# Patient Record
Sex: FEMALE | Race: WHITE | NOT HISPANIC OR LATINO | Employment: UNEMPLOYED | URBAN - METROPOLITAN AREA
[De-identification: names, ages, dates, MRNs, and addresses within clinical notes are randomized per-mention and may not be internally consistent; named-entity substitution may affect disease eponyms.]

---

## 2023-08-16 PROBLEM — H26.9 CATARACT OF RIGHT EYE: Status: ACTIVE | Noted: 2023-08-16

## 2023-08-16 PROBLEM — H26.492 PCO (POSTERIOR CAPSULAR OPACIFICATION), LEFT: Status: ACTIVE | Noted: 2023-08-16

## 2023-08-16 PROBLEM — L72.0 EPIDERMAL INCLUSION CYST: Status: ACTIVE | Noted: 2023-08-16

## 2023-08-16 PROBLEM — H53.8 BLURRED VISION, BILATERAL: Status: ACTIVE | Noted: 2023-08-16

## 2023-08-16 PROBLEM — Z96.1 PSEUDOPHAKIA: Status: ACTIVE | Noted: 2023-08-16

## 2023-08-16 PROBLEM — H52.13 MYOPIA OF BOTH EYES: Status: ACTIVE | Noted: 2023-08-16

## 2023-08-16 PROBLEM — H52.7 REFRACTIVE ERROR: Status: ACTIVE | Noted: 2023-08-16

## 2023-08-16 PROBLEM — I10 HTN (HYPERTENSION): Status: ACTIVE | Noted: 2023-08-16

## 2023-08-16 PROBLEM — H25.13 NUCLEAR SCLEROSIS OF BOTH EYES: Status: ACTIVE | Noted: 2023-08-16

## 2023-08-16 PROBLEM — N95.1 MENOPAUSAL SYMPTOM: Status: ACTIVE | Noted: 2023-08-16

## 2023-08-16 PROBLEM — H35.352 CYSTOID MACULAR EDEMA, LEFT EYE: Status: ACTIVE | Noted: 2023-08-16

## 2023-08-16 RX ORDER — NAPROXEN SODIUM 220 MG/1
1 TABLET, FILM COATED ORAL EVERY OTHER DAY
COMMUNITY
End: 2023-12-01 | Stop reason: WASHOUT

## 2023-08-16 RX ORDER — CHOLECALCIFEROL (VITAMIN D3) 25 MCG
TABLET ORAL
COMMUNITY

## 2023-08-16 RX ORDER — MULTIVIT-MIN/FA/LYCOPEN/LUTEIN .4-300-25
TABLET ORAL
COMMUNITY

## 2023-09-02 PROBLEM — H52.13 MYOPIA OF BOTH EYES WITH REGULAR ASTIGMATISM: Status: ACTIVE | Noted: 2023-09-02

## 2023-09-02 PROBLEM — H52.223 MYOPIA OF BOTH EYES WITH REGULAR ASTIGMATISM: Status: ACTIVE | Noted: 2023-09-02

## 2023-10-10 ENCOUNTER — OFFICE VISIT (OUTPATIENT)
Dept: OPHTHALMOLOGY | Facility: CLINIC | Age: 73
End: 2023-10-10
Payer: COMMERCIAL

## 2023-10-10 DIAGNOSIS — H25.11 AGE-RELATED NUCLEAR CATARACT OF RIGHT EYE: ICD-10-CM

## 2023-10-10 DIAGNOSIS — H52.223 REGULAR ASTIGMATISM OF BOTH EYES: ICD-10-CM

## 2023-10-10 DIAGNOSIS — H43.811 POSTERIOR VITREOUS DETACHMENT OF RIGHT EYE: ICD-10-CM

## 2023-10-10 DIAGNOSIS — H52.4 PRESBYOPIA: ICD-10-CM

## 2023-10-10 DIAGNOSIS — H52.13 MYOPIA OF BOTH EYES: Primary | ICD-10-CM

## 2023-10-10 PROCEDURE — 92014 COMPRE OPH EXAM EST PT 1/>: CPT | Performed by: OPTOMETRIST

## 2023-10-10 PROCEDURE — 92015 DETERMINE REFRACTIVE STATE: CPT | Performed by: OPTOMETRIST

## 2023-10-10 ASSESSMENT — VISUAL ACUITY
OD_CC: 20/40-1
OS_CC: 20/40-1
OS_CC+: -2
OD_CC: 20/20
METHOD: SNELLEN - LINEAR
CORRECTION_TYPE: CONTACTS
OS_CC: 20/25
OD_CC+: -1
METHOD_MR_RETINOSCOPY: 1

## 2023-10-10 ASSESSMENT — ENCOUNTER SYMPTOMS
CONSTITUTIONAL NEGATIVE: 0
PSYCHIATRIC NEGATIVE: 0
RESPIRATORY NEGATIVE: 0
MUSCULOSKELETAL NEGATIVE: 0
CARDIOVASCULAR NEGATIVE: 0
ENDOCRINE NEGATIVE: 0
EYES NEGATIVE: 0
HEMATOLOGIC/LYMPHATIC NEGATIVE: 0
ALLERGIC/IMMUNOLOGIC NEGATIVE: 0
GASTROINTESTINAL NEGATIVE: 0
NEUROLOGICAL NEGATIVE: 0

## 2023-10-10 ASSESSMENT — REFRACTION_MANIFEST
OS_AXIS: 005
OS_SPHERE: -1.50
OD_CYLINDER: -1.00
OS_AXIS: 025
OD_SPHERE: -2.50
OD_SPHERE: -1.75
OD_ADD: +2.50
OS_ADD: +2.50
OD_AXIS: 060
OS_CYLINDER: -1.50
OD_AXIS: 155
OS_SPHERE: -2.50
OS_CYLINDER: +0.50
OD_CYLINDER: +1.25

## 2023-10-10 ASSESSMENT — CUP TO DISC RATIO
OD_RATIO: 0.3
OS_RATIO: 0.3

## 2023-10-10 ASSESSMENT — CONF VISUAL FIELD
OS_SUPERIOR_TEMPORAL_RESTRICTION: 0
OD_INFERIOR_NASAL_RESTRICTION: 0
OD_INFERIOR_TEMPORAL_RESTRICTION: 0
METHOD: COUNTING FINGERS
OD_NORMAL: 1
OS_INFERIOR_NASAL_RESTRICTION: 0
OD_SUPERIOR_NASAL_RESTRICTION: 0
OD_SUPERIOR_TEMPORAL_RESTRICTION: 0
OS_INFERIOR_TEMPORAL_RESTRICTION: 0
OS_SUPERIOR_NASAL_RESTRICTION: 0
OS_NORMAL: 1

## 2023-10-10 ASSESSMENT — TONOMETRY
OS_IOP_MMHG: 14
IOP_METHOD: GOLDMANN APPLANATION
OD_IOP_MMHG: 12

## 2023-10-10 ASSESSMENT — REFRACTION_CURRENTRX
OD_DIAMETER: 9.5
OD_ADDL_SPECS: GREEN
OD_SPHERE: -1.50
OD_BASECURVE: 7.6
OS_BRAND: ESSILOR RGP
OS_DIAMETER: 9.5
OD_BRAND: ESSILOR RGP
OS_BASECURVE: 7.44
OS_SPHERE: -2.25
OS_ADDL_SPECS: GREEN

## 2023-10-10 ASSESSMENT — EXTERNAL EXAM - LEFT EYE: OS_EXAM: NORMAL

## 2023-10-10 ASSESSMENT — EXTERNAL EXAM - RIGHT EYE: OD_EXAM: NORMAL

## 2023-10-10 NOTE — PROGRESS NOTES
Assessment/Plan   Problem List Items Addressed This Visit             ICD-10-CM       Eye/Vision problems    Myopia of both eyes - Primary H52.13     New spec rx released today per patient request. Ocular health wnl for age OU. Monitor 1 year or sooner prn. Refraction billed today.          Cataract of right eye H26.9     Patient's cataracts are not visually significant. Will monitor for changes. No indication for surgery at this time.          Regular astigmatism of both eyes H52.223    Presbyopia H52.4    Posterior vitreous detachment of right eye H43.811     The patient was advised that he/she has a vitreous detachment.  As we age the gel inside the eye shrinks, and moves forward away from the retina. The fibrous ring that used to anchor the gel to the back of the eye is now floating freely inside the eye and is seen as a large floater. Other optical imperfections may also be visible.  This floater will never go away but will become less noticeable with time. The floater will be apparent most often while reading and in bright light. Also as this gel shrinks it can tug on the retina causing the patient to see flashes of light. The concern is that as it tugs it may tear the retina leading to a retinal detachment. No retinal tears have been seen today. The patient is to follow-up in 6-8wks for another dilated exam.  The patient is at increased risk for a retinal tear or detachment.  The patient has been told that if they notice an increase in number or frequency of floaters or flashes or if the patient notices a curtain falling over any part of their vision this could indicate a problem with the retina that could require immediate treatment.  The patient is to call the office ASAP if the listed symptoms occur.         Mac OCT at next.     Recommend WC qday x 8-10min and artificial tears tid both eyes (OU).   Will also recheck CL wetting at next. Consider hydrapeg/plasma

## 2023-10-10 NOTE — ASSESSMENT & PLAN NOTE
Patient's cataracts are not visually significant. Will monitor for changes. No indication for surgery at this time.

## 2023-10-10 NOTE — ASSESSMENT & PLAN NOTE
New spec rx released today per patient request. Ocular health wnl for age OU. Monitor 1 year or sooner prn. Refraction billed today.

## 2023-10-10 NOTE — ASSESSMENT & PLAN NOTE
The patient was advised that he/she has a vitreous detachment.  As we age the gel inside the eye shrinks, and moves forward away from the retina. The fibrous ring that used to anchor the gel to the back of the eye is now floating freely inside the eye and is seen as a large floater. Other optical imperfections may also be visible.  This floater will never go away but will become less noticeable with time. The floater will be apparent most often while reading and in bright light. Also as this gel shrinks it can tug on the retina causing the patient to see flashes of light. The concern is that as it tugs it may tear the retina leading to a retinal detachment. No retinal tears have been seen today. The patient is to follow-up in 6-8wks for another dilated exam.  The patient is at increased risk for a retinal tear or detachment.  The patient has been told that if they notice an increase in number or frequency of floaters or flashes or if the patient notices a curtain falling over any part of their vision this could indicate a problem with the retina that could require immediate treatment.  The patient is to call the office ASAP if the listed symptoms occur.

## 2023-12-01 ENCOUNTER — APPOINTMENT (OUTPATIENT)
Dept: OPHTHALMOLOGY | Facility: CLINIC | Age: 73
End: 2023-12-01
Payer: MEDICARE

## 2023-12-01 ENCOUNTER — OFFICE VISIT (OUTPATIENT)
Dept: OPHTHALMOLOGY | Facility: CLINIC | Age: 73
End: 2023-12-01
Payer: MEDICARE

## 2023-12-01 DIAGNOSIS — H35.371 EPIRETINAL MEMBRANE (ERM) OF RIGHT EYE: ICD-10-CM

## 2023-12-01 DIAGNOSIS — H43.811 POSTERIOR VITREOUS DETACHMENT OF RIGHT EYE: Primary | ICD-10-CM

## 2023-12-01 PROCEDURE — 99214 OFFICE O/P EST MOD 30 MIN: CPT | Performed by: OPTOMETRIST

## 2023-12-01 PROCEDURE — 92134 CPTRZ OPH DX IMG PST SGM RTA: CPT | Mod: BILATERAL PROCEDURE | Performed by: OPTOMETRIST

## 2023-12-01 ASSESSMENT — CUP TO DISC RATIO
OD_RATIO: 0.3
OS_RATIO: 0.3

## 2023-12-01 ASSESSMENT — TONOMETRY
IOP_METHOD: GOLDMANN APPLANATION
OD_IOP_MMHG: 13
OS_IOP_MMHG: 15

## 2023-12-01 ASSESSMENT — VISUAL ACUITY
OS_CC+: -3
METHOD: SNELLEN - LINEAR
CORRECTION_TYPE: CONTACTS
OD_CC: 20/25
OS_CC: 20/25

## 2023-12-01 ASSESSMENT — CONF VISUAL FIELD
OS_INFERIOR_TEMPORAL_RESTRICTION: 0
OS_SUPERIOR_TEMPORAL_RESTRICTION: 0
OS_NORMAL: 1
OS_INFERIOR_NASAL_RESTRICTION: 0
METHOD: COUNTING FINGERS
OS_SUPERIOR_NASAL_RESTRICTION: 0

## 2023-12-01 ASSESSMENT — EXTERNAL EXAM - RIGHT EYE: OD_EXAM: NORMAL

## 2023-12-01 ASSESSMENT — EXTERNAL EXAM - LEFT EYE: OS_EXAM: NORMAL

## 2023-12-01 NOTE — PROGRESS NOTES
Assessment/Plan   Diagnoses and all orders for this visit:  Posterior vitreous detachment of right eye  -     OCT, Retina - OU - Both Eyes  Epiretinal membrane (ERM) of right eye  Discussed findings and etiology. ERM not visually significant at this time. Amsler grid for at home monitoring. RTC 1 year for OCT Macula or sooner with changes on grid.Retina precautions given.    Monitor 1 year.   Continue w/ dry eye treatment WC at ATs bid-tid both eyes.

## 2024-07-29 ENCOUNTER — APPOINTMENT (OUTPATIENT)
Dept: OPHTHALMOLOGY | Facility: CLINIC | Age: 74
End: 2024-07-29
Payer: MEDICARE

## 2024-07-29 DIAGNOSIS — H35.371 EPIRETINAL MEMBRANE (ERM) OF RIGHT EYE: ICD-10-CM

## 2024-07-29 DIAGNOSIS — H35.342 MACULAR HOLE, LEFT: ICD-10-CM

## 2024-07-29 DIAGNOSIS — H35.341 MACULAR HOLE, RIGHT EYE: Primary | ICD-10-CM

## 2024-07-29 PROCEDURE — 99213 OFFICE O/P EST LOW 20 MIN: CPT | Performed by: OPTOMETRIST

## 2024-07-29 PROCEDURE — 92134 CPTRZ OPH DX IMG PST SGM RTA: CPT | Performed by: OPTOMETRIST

## 2024-07-29 ASSESSMENT — REFRACTION_CURRENTRX
OS_ADDL_SPECS: GREEN
OD_BRAND: ESSILOR RGP
OS_SPHERE: -2.25
OD_DIAMETER: 9.5
OS_CYLINDER: -1.50
OS_DIAMETER: 9.5
OD_BASECURVE: 7.6
OD_AXIS: 060
OS_AXIS: 030
OS_SPHERE: -1.50
OD_SPHERE: -1.50
OS_BRAND: ESSILOR RGP
OD_ADDL_SPECS: GREEN
OS_BASECURVE: 7.44
OD_SPHERE: -1.75
OD_CYLINDER: -1.00

## 2024-07-29 ASSESSMENT — VISUAL ACUITY
OS_CC: 20/25
VA_OR_OD_CURRENT_RX: 20/40
OS_CC+: -3
METHOD: SNELLEN - LINEAR
VA_OR_OD_CURRENT_RX: 20/40
VA_OR_OS_CURRENT_RX: 20/25
OD_CC: 20/40
OD_CC+: +1
CORRECTION_TYPE: CONTACTS

## 2024-07-29 ASSESSMENT — CONF VISUAL FIELD
OD_INFERIOR_NASAL_RESTRICTION: 0
METHOD: COUNTING FINGERS
OS_SUPERIOR_NASAL_RESTRICTION: 0
OD_INFERIOR_TEMPORAL_RESTRICTION: 0
OS_INFERIOR_TEMPORAL_RESTRICTION: 0
OD_NORMAL: 1
OD_SUPERIOR_TEMPORAL_RESTRICTION: 0
OD_SUPERIOR_NASAL_RESTRICTION: 0
OS_INFERIOR_NASAL_RESTRICTION: 0
OS_NORMAL: 1
OS_SUPERIOR_TEMPORAL_RESTRICTION: 0

## 2024-07-29 ASSESSMENT — EXTERNAL EXAM - RIGHT EYE: OD_EXAM: NORMAL

## 2024-07-29 ASSESSMENT — ENCOUNTER SYMPTOMS
EYES NEGATIVE: 0
ALLERGIC/IMMUNOLOGIC NEGATIVE: 0
HEMATOLOGIC/LYMPHATIC NEGATIVE: 0
CONSTITUTIONAL NEGATIVE: 0
ENDOCRINE NEGATIVE: 0
GASTROINTESTINAL NEGATIVE: 0
RESPIRATORY NEGATIVE: 0
CARDIOVASCULAR NEGATIVE: 0
PSYCHIATRIC NEGATIVE: 0
MUSCULOSKELETAL NEGATIVE: 0
NEUROLOGICAL NEGATIVE: 0

## 2024-07-29 ASSESSMENT — REFRACTION_WEARINGRX
OD_ADD: +2.50
OS_ADD: +2.50
OD_AXIS: 060
OD_SPHERE: -1.75
OS_CYLINDER: -1.50
OS_SPHERE: -1.50
OD_CYLINDER: -1.00
OS_AXIS: 025

## 2024-07-29 ASSESSMENT — EXTERNAL EXAM - LEFT EYE: OS_EXAM: NORMAL

## 2024-07-29 ASSESSMENT — CUP TO DISC RATIO
OD_RATIO: 0.3
OS_RATIO: 0.3

## 2024-07-29 NOTE — PROGRESS NOTES
Assessment/Plan   Diagnoses and all orders for this visit:  Macular hole, right eye  -     OCT, Retina - OU - Both Eyes  Consult retina this week. New small FTMH, visually significant. Previous FTMH repair w/ JJE in 2020.   Ok to wear contacts and glasses.   Epiretinal membrane (ERM) of right eye  Macular hole, left

## 2024-08-01 ENCOUNTER — OFFICE VISIT (OUTPATIENT)
Dept: OPHTHALMOLOGY | Facility: CLINIC | Age: 74
End: 2024-08-01
Payer: MEDICARE

## 2024-08-01 DIAGNOSIS — H35.341 MACULAR HOLE, RIGHT EYE: Primary | ICD-10-CM

## 2024-08-01 PROCEDURE — 92134 CPTRZ OPH DX IMG PST SGM RTA: CPT

## 2024-08-01 PROCEDURE — 99214 OFFICE O/P EST MOD 30 MIN: CPT

## 2024-08-01 RX ORDER — KETOROLAC TROMETHAMINE 4 MG/ML
1 SOLUTION/ DROPS OPHTHALMIC 4 TIMES DAILY
Qty: 5 ML | Refills: 0 | Status: SHIPPED | OUTPATIENT
Start: 2024-08-01 | End: 2024-09-15

## 2024-08-01 RX ORDER — DORZOLAMIDE HCL 20 MG/ML
SOLUTION/ DROPS OPHTHALMIC
Qty: 5 ML | Refills: 0 | Status: SHIPPED | OUTPATIENT
Start: 2024-08-01

## 2024-08-01 RX ORDER — DIFLUPREDNATE OPHTHALMIC 0.5 MG/ML
1 EMULSION OPHTHALMIC EVERY 8 HOURS
Qty: 5 ML | Refills: 0 | Status: SHIPPED | OUTPATIENT
Start: 2024-08-01 | End: 2024-09-15

## 2024-08-01 ASSESSMENT — CUP TO DISC RATIO
OD_RATIO: 0.3
OS_RATIO: 0.3

## 2024-08-01 ASSESSMENT — CONF VISUAL FIELD
OS_INFERIOR_NASAL_RESTRICTION: 0
OS_INFERIOR_TEMPORAL_RESTRICTION: 0
OD_SUPERIOR_TEMPORAL_RESTRICTION: 0
OD_SUPERIOR_NASAL_RESTRICTION: 0
OD_NORMAL: 1
OS_SUPERIOR_TEMPORAL_RESTRICTION: 0
OD_INFERIOR_NASAL_RESTRICTION: 0
OS_NORMAL: 1
OD_INFERIOR_TEMPORAL_RESTRICTION: 0
OS_SUPERIOR_NASAL_RESTRICTION: 0

## 2024-08-01 ASSESSMENT — VISUAL ACUITY
OD_CC: 20/30
OS_CC+: -2
METHOD: SNELLEN - LINEAR
OD_CC+: -2
OS_CC: 20/40

## 2024-08-01 ASSESSMENT — ENCOUNTER SYMPTOMS
CONSTITUTIONAL NEGATIVE: 0
EYES NEGATIVE: 0
ENDOCRINE NEGATIVE: 0
PSYCHIATRIC NEGATIVE: 0
NEUROLOGICAL NEGATIVE: 0
MUSCULOSKELETAL NEGATIVE: 0
ALLERGIC/IMMUNOLOGIC NEGATIVE: 0
RESPIRATORY NEGATIVE: 0
HEMATOLOGIC/LYMPHATIC NEGATIVE: 0
CARDIOVASCULAR NEGATIVE: 0
GASTROINTESTINAL NEGATIVE: 0

## 2024-08-01 ASSESSMENT — TONOMETRY
IOP_METHOD: TONOPEN
OD_IOP_MMHG: 15
OS_IOP_MMHG: 15

## 2024-08-01 ASSESSMENT — EXTERNAL EXAM - RIGHT EYE: OD_EXAM: NORMAL

## 2024-08-01 ASSESSMENT — EXTERNAL EXAM - LEFT EYE: OS_EXAM: NORMAL

## 2024-08-01 NOTE — PROGRESS NOTES
# Macular hole, right eye  # Epiretinal membrane (ERM) of right eye  - Referral from Dr. De Jesus for new small FTMH, visually significant.   - Sx of blurry vision OD for past 2 wks     # Macular hole left eye  - s/p PPV ICG ILM Peel FAX 20% SF6 OS ([facility] 11/09/20)  - s/p Phaco IOL OS  ([facility])  - s/p Yag OS  - Retina attached in all 4 quadrants, hole closed with persistent residual CME    OCT : 08/01/24     OD: small full thickness macular hole with adjacent intraretinal cystic spaces, epiretinal membrane (ERM)  OS: loss of foveal contour, cystic intraretinal space parafoveal, normal EZ; stable over the past few year      Plan:   - FTMH is very small; will try medical management of first with dorzolamide BID OD, Durezol TID OD, and Ketorolac QID OD  - If issues with insurance not covering Durezol, will prescribe pred QID OD instead   - If macular hole OD does not close, will consider surgery   - RTC in 1 month       # Refractive error  - Wears contacts and glasses   - Follows with Dr. De Jesus

## 2024-09-05 ENCOUNTER — APPOINTMENT (OUTPATIENT)
Dept: OPHTHALMOLOGY | Facility: CLINIC | Age: 74
End: 2024-09-05
Payer: MEDICARE

## 2024-09-05 ENCOUNTER — OFFICE VISIT (OUTPATIENT)
Dept: OPHTHALMOLOGY | Facility: CLINIC | Age: 74
End: 2024-09-05
Payer: MEDICARE

## 2024-09-05 DIAGNOSIS — H35.341 MACULAR HOLE, RIGHT EYE: ICD-10-CM

## 2024-09-05 DIAGNOSIS — H35.371 EPIRETINAL MEMBRANE (ERM) OF RIGHT EYE: ICD-10-CM

## 2024-09-05 DIAGNOSIS — H35.351 CYSTOID MACULAR EDEMA, RIGHT EYE: Primary | ICD-10-CM

## 2024-09-05 PROCEDURE — 92134 CPTRZ OPH DX IMG PST SGM RTA: CPT

## 2024-09-05 PROCEDURE — 99213 OFFICE O/P EST LOW 20 MIN: CPT

## 2024-09-05 RX ORDER — PREDNISOLONE ACETATE 10 MG/ML
SUSPENSION/ DROPS OPHTHALMIC
Qty: 5 ML | Refills: 1 | Status: SHIPPED | OUTPATIENT
Start: 2024-09-05

## 2024-09-05 RX ORDER — KETOROLAC TROMETHAMINE 5 MG/ML
SOLUTION OPHTHALMIC
Qty: 5 ML | Refills: 2 | Status: SHIPPED | OUTPATIENT
Start: 2024-09-05

## 2024-09-05 ASSESSMENT — CONF VISUAL FIELD
OD_SUPERIOR_NASAL_RESTRICTION: 0
OS_NORMAL: 1
OD_INFERIOR_TEMPORAL_RESTRICTION: 0
OD_SUPERIOR_TEMPORAL_RESTRICTION: 0
OS_SUPERIOR_TEMPORAL_RESTRICTION: 0
OS_INFERIOR_TEMPORAL_RESTRICTION: 0
OD_INFERIOR_NASAL_RESTRICTION: 0
OD_NORMAL: 1
OS_SUPERIOR_NASAL_RESTRICTION: 0
OS_INFERIOR_NASAL_RESTRICTION: 0

## 2024-09-05 ASSESSMENT — ENCOUNTER SYMPTOMS
CONSTITUTIONAL NEGATIVE: 0
HEMATOLOGIC/LYMPHATIC NEGATIVE: 0
ENDOCRINE NEGATIVE: 0
RESPIRATORY NEGATIVE: 0
ALLERGIC/IMMUNOLOGIC NEGATIVE: 0
CARDIOVASCULAR NEGATIVE: 0
NEUROLOGICAL NEGATIVE: 0
GASTROINTESTINAL NEGATIVE: 0
MUSCULOSKELETAL NEGATIVE: 0
EYES NEGATIVE: 1
PSYCHIATRIC NEGATIVE: 0

## 2024-09-05 ASSESSMENT — EXTERNAL EXAM - LEFT EYE: OS_EXAM: NORMAL

## 2024-09-05 ASSESSMENT — VISUAL ACUITY
CORRECTION_TYPE: GLASSES
OS_CC+: -1
METHOD: SNELLEN - LINEAR
OS_CC: 20/30
OD_CC: 20/30

## 2024-09-05 ASSESSMENT — TONOMETRY
IOP_METHOD: TONOPEN
OD_IOP_MMHG: 16
OS_IOP_MMHG: 12

## 2024-09-05 ASSESSMENT — CUP TO DISC RATIO
OD_RATIO: 0.3
OS_RATIO: 0.3

## 2024-09-05 ASSESSMENT — EXTERNAL EXAM - RIGHT EYE: OD_EXAM: NORMAL

## 2024-09-05 NOTE — PROGRESS NOTES
# Macular hole, right eye  # Epiretinal membrane (ERM) of right eye  - Referral from Dr. De Jesus for new small FTMH, visually significant.   - Sx of blurry vision OD for past 2 wks     # Macular hole left eye  - s/p PPV ICG ILM Peel FAX 20% SF6 OS ([facility] 11/09/20)  - s/p Phaco IOL OS  ([facility])  - s/p Yag OS  - Retina attached in all 4 quadrants, hole closed with persistent residual CME    OCT : 09/05/24     OD: small full thickness macular hole with adjacent intraretinal cystic spaces, epiretinal membrane (ERM)  OS: loss of foveal contour, cystic intraretinal space parafoveal, normal EZ; stable over the past few year    Plan:   - FTMH is very small; will try medical management of first with dorzolamide BID OD, Durezol TID OD, and Ketorolac QID OD; still persistent ; will try another month considering subjective improvement. Schedule surgery in case no closure by next visit  - If issues with insurance not covering Durezol, will prescribe pred QID OD instead   - If macular hole OD does not close, will consider surgery   - RTC in 4 weeks     # Refractive error  - Wears contacts and glasses   - Follows with Dr. De Jesus

## 2024-09-27 ENCOUNTER — TELEPHONE (OUTPATIENT)
Dept: OPHTHALMOLOGY | Facility: HOSPITAL | Age: 74
End: 2024-09-27
Payer: MEDICARE

## 2024-09-27 DIAGNOSIS — H35.351 CYSTOID MACULAR EDEMA, RIGHT EYE: Primary | ICD-10-CM

## 2024-09-27 DIAGNOSIS — H35.351 CYSTOID MACULAR EDEMA OF RIGHT EYE: Primary | ICD-10-CM

## 2024-09-27 RX ORDER — PREDNISOLONE ACETATE 10 MG/ML
1 SUSPENSION/ DROPS OPHTHALMIC 4 TIMES DAILY
Qty: 5 ML | Refills: 0 | Status: SHIPPED | OUTPATIENT
Start: 2024-09-27 | End: 2024-10-11

## 2024-09-27 RX ORDER — PREDNISOLONE ACETATE 10 MG/ML
SUSPENSION/ DROPS OPHTHALMIC
Qty: 5 ML | Refills: 1 | Status: SHIPPED | OUTPATIENT
Start: 2024-09-27 | End: 2024-09-27

## 2024-10-02 ENCOUNTER — PREP FOR PROCEDURE (OUTPATIENT)
Dept: OPHTHALMOLOGY | Facility: CLINIC | Age: 74
End: 2024-10-02

## 2024-10-02 ENCOUNTER — APPOINTMENT (OUTPATIENT)
Dept: OPHTHALMOLOGY | Facility: CLINIC | Age: 74
End: 2024-10-02
Payer: MEDICARE

## 2024-10-02 DIAGNOSIS — H35.341 MACULAR HOLE, RIGHT EYE: Primary | ICD-10-CM

## 2024-10-02 DIAGNOSIS — H35.371 EPIRETINAL MEMBRANE (ERM) OF RIGHT EYE: ICD-10-CM

## 2024-10-02 DIAGNOSIS — H35.342 MACULAR HOLE, LEFT: Primary | ICD-10-CM

## 2024-10-02 RX ORDER — SODIUM CHLORIDE, SODIUM LACTATE, POTASSIUM CHLORIDE, CALCIUM CHLORIDE 600; 310; 30; 20 MG/100ML; MG/100ML; MG/100ML; MG/100ML
20 INJECTION, SOLUTION INTRAVENOUS CONTINUOUS
OUTPATIENT
Start: 2024-10-02

## 2024-10-02 RX ORDER — PHENYLEPHRINE HYDROCHLORIDE 25 MG/ML
1 SOLUTION/ DROPS OPHTHALMIC
OUTPATIENT
Start: 2024-10-02 | End: 2024-10-02

## 2024-10-02 RX ORDER — TROPICAMIDE 10 MG/ML
1 SOLUTION/ DROPS OPHTHALMIC
OUTPATIENT
Start: 2024-10-02 | End: 2024-10-02

## 2024-10-02 RX ORDER — PROPARACAINE HYDROCHLORIDE 5 MG/ML
1 SOLUTION/ DROPS OPHTHALMIC ONCE
OUTPATIENT
Start: 2024-10-02 | End: 2024-10-02

## 2024-10-02 ASSESSMENT — TONOMETRY
IOP_METHOD: GOLDMANN APPLANATION
OD_IOP_MMHG: 16
OS_IOP_MMHG: 12

## 2024-10-02 ASSESSMENT — ENCOUNTER SYMPTOMS
CONSTITUTIONAL NEGATIVE: 0
MUSCULOSKELETAL NEGATIVE: 0
NEUROLOGICAL NEGATIVE: 0
EYES NEGATIVE: 0
ENDOCRINE NEGATIVE: 0
RESPIRATORY NEGATIVE: 0
CARDIOVASCULAR NEGATIVE: 0
PSYCHIATRIC NEGATIVE: 0
ALLERGIC/IMMUNOLOGIC NEGATIVE: 0
HEMATOLOGIC/LYMPHATIC NEGATIVE: 0
GASTROINTESTINAL NEGATIVE: 0

## 2024-10-02 ASSESSMENT — CONF VISUAL FIELD
OD_SUPERIOR_NASAL_RESTRICTION: 0
OS_INFERIOR_TEMPORAL_RESTRICTION: 0
OS_INFERIOR_NASAL_RESTRICTION: 0
OS_NORMAL: 1
OD_INFERIOR_NASAL_RESTRICTION: 0
OS_SUPERIOR_TEMPORAL_RESTRICTION: 0
OD_INFERIOR_TEMPORAL_RESTRICTION: 0
OD_NORMAL: 1
OD_SUPERIOR_TEMPORAL_RESTRICTION: 0
METHOD: COUNTING FINGERS
OS_SUPERIOR_NASAL_RESTRICTION: 0

## 2024-10-02 ASSESSMENT — CUP TO DISC RATIO
OS_RATIO: 0.3
OD_RATIO: 0.3

## 2024-10-02 ASSESSMENT — VISUAL ACUITY
OD_CC: 20/30
METHOD: SNELLEN - LINEAR
OS_CC: 20/30

## 2024-10-02 ASSESSMENT — EXTERNAL EXAM - LEFT EYE: OS_EXAM: NORMAL

## 2024-10-02 ASSESSMENT — EXTERNAL EXAM - RIGHT EYE: OD_EXAM: NORMAL

## 2024-10-02 NOTE — H&P
History Of Present Illness  Ronna Elizalde is a 74 y.o. female presenting with [facility] OD.     Past Medical History  She has a past medical history of Age-related nuclear cataract, left eye (06/22/2021), Personal history of other diseases of the nervous system and sense organs, and Unspecified disorder of eyelid.    Surgical History  She has a past surgical history that includes Other surgical history (06/22/2021).     Social History  She has no history on file for tobacco use, alcohol use, and drug use.     Allergies  Penicillins    ROS  Patient denies ocular pain, redness, discharge, decreased vision, double vision, blind spots, flashes, or floaters.     Physical Exam  Not recorded          Assessment/Plan       PPV MP Gas       I spent 10 minutes in the professional and overall care of this patient.      Sabina Parker MD PhD

## 2024-10-02 NOTE — PROGRESS NOTES
# Macular hole, right eye  # Epiretinal membrane (ERM) of right eye  - Referral from Dr. De Jesus for new small FTMH, visually significant.   - Sx of blurry vision OD for past 2 wks     # Macular hole left eye  - s/p PPV ICG ILM Peel FAX 20% SF6 OS ([facility] 11/09/20)  - s/p Phaco IOL OS  ([facility])  - s/p Yag OS  - Retina attached in all 4 quadrants, hole closed with persistent residual CME    OCT : 10/02/24     OD: small full thickness macular hole with adjacent intraretinal cystic spaces, epiretinal membrane (ERM)  OS: loss of foveal contour, cystic intraretinal space parafoveal, normal EZ; stable over the past few year    Plan:   - FTMH is very small; will try medical management of first with dorzolamide BID OD, Durezol TID OD, and Ketorolac QID OD; still persistent ; will try another month considering subjective improvement. Schedule surgery in case no closure by next visit  - If issues with insurance not covering Durezol, will prescribe pred QID OD instead   - If macular hole OD does not close, will consider surgery   - RTC in 4 weeks     # Refractive error  - Wears contacts and glasses   - Follows with Dr. De Jesus

## 2024-10-10 ENCOUNTER — ANESTHESIA EVENT (OUTPATIENT)
Dept: OPERATING ROOM | Facility: CLINIC | Age: 74
End: 2024-10-10
Payer: MEDICARE

## 2024-10-14 ENCOUNTER — ANESTHESIA (OUTPATIENT)
Dept: OPERATING ROOM | Facility: CLINIC | Age: 74
End: 2024-10-14
Payer: MEDICARE

## 2024-10-14 ENCOUNTER — HOSPITAL ENCOUNTER (OUTPATIENT)
Facility: CLINIC | Age: 74
Setting detail: OUTPATIENT SURGERY
Discharge: HOME | End: 2024-10-14
Payer: MEDICARE

## 2024-10-14 VITALS
DIASTOLIC BLOOD PRESSURE: 70 MMHG | WEIGHT: 145.94 LBS | SYSTOLIC BLOOD PRESSURE: 161 MMHG | HEIGHT: 60 IN | BODY MASS INDEX: 28.65 KG/M2 | TEMPERATURE: 97.3 F | OXYGEN SATURATION: 99 % | HEART RATE: 90 BPM | RESPIRATION RATE: 16 BRPM

## 2024-10-14 DIAGNOSIS — H35.341 MACULAR HOLE, RIGHT EYE: Primary | ICD-10-CM

## 2024-10-14 DIAGNOSIS — H35.371 EPIRETINAL MEMBRANE (ERM) OF RIGHT EYE: ICD-10-CM

## 2024-10-14 DIAGNOSIS — H33.321 RETINAL HOLE OF RIGHT EYE: ICD-10-CM

## 2024-10-14 PROBLEM — I97.711 INTRAOPERATIVE CARDIAC ARREST DURING OTHER SURGERY: Status: ACTIVE | Noted: 2024-10-14

## 2024-10-14 PROBLEM — Z86.69 HISTORY OF MIGRAINE HEADACHES: Status: ACTIVE | Noted: 2024-10-14

## 2024-10-14 PROBLEM — K21.9 GASTROESOPHAGEAL REFLUX DISEASE: Status: ACTIVE | Noted: 2024-10-14

## 2024-10-14 PROCEDURE — 7100000009 HC PHASE TWO TIME - INITIAL BASE CHARGE

## 2024-10-14 PROCEDURE — 2720000007 HC OR 272 NO HCPCS

## 2024-10-14 PROCEDURE — 7100000010 HC PHASE TWO TIME - EACH INCREMENTAL 1 MINUTE

## 2024-10-14 PROCEDURE — 3600000003 HC OR TIME - INITIAL BASE CHARGE - PROCEDURE LEVEL THREE

## 2024-10-14 PROCEDURE — 3600000008 HC OR TIME - EACH INCREMENTAL 1 MINUTE - PROCEDURE LEVEL THREE

## 2024-10-14 PROCEDURE — 2500000004 HC RX 250 GENERAL PHARMACY W/ HCPCS (ALT 636 FOR OP/ED): Mod: JG

## 2024-10-14 PROCEDURE — 2500000005 HC RX 250 GENERAL PHARMACY W/O HCPCS

## 2024-10-14 PROCEDURE — 3700000001 HC GENERAL ANESTHESIA TIME - INITIAL BASE CHARGE

## 2024-10-14 PROCEDURE — 2500000004 HC RX 250 GENERAL PHARMACY W/ HCPCS (ALT 636 FOR OP/ED): Performed by: ANESTHESIOLOGIST ASSISTANT

## 2024-10-14 PROCEDURE — 2500000001 HC RX 250 WO HCPCS SELF ADMINISTERED DRUGS (ALT 637 FOR MEDICARE OP)

## 2024-10-14 PROCEDURE — 67042 VIT FOR MACULAR HOLE: CPT

## 2024-10-14 PROCEDURE — 3700000002 HC GENERAL ANESTHESIA TIME - EACH INCREMENTAL 1 MINUTE

## 2024-10-14 DEVICE — IMPLANTABLE DEVICE: Type: IMPLANTABLE DEVICE | Site: EYE | Status: FUNCTIONAL

## 2024-10-14 RX ORDER — SODIUM CHLORIDE, SODIUM LACTATE, POTASSIUM CHLORIDE, CALCIUM CHLORIDE 600; 310; 30; 20 MG/100ML; MG/100ML; MG/100ML; MG/100ML
20 INJECTION, SOLUTION INTRAVENOUS CONTINUOUS
Status: DISCONTINUED | OUTPATIENT
Start: 2024-10-14 | End: 2024-10-14 | Stop reason: HOSPADM

## 2024-10-14 RX ORDER — PHENYLEPHRINE HYDROCHLORIDE 25 MG/ML
1 SOLUTION/ DROPS OPHTHALMIC
Status: COMPLETED | OUTPATIENT
Start: 2024-10-14 | End: 2024-10-14

## 2024-10-14 RX ORDER — PROPOFOL 10 MG/ML
INJECTION, EMULSION INTRAVENOUS AS NEEDED
Status: DISCONTINUED | OUTPATIENT
Start: 2024-10-14 | End: 2024-10-14

## 2024-10-14 RX ORDER — POVIDONE-IODINE 5 %
SOLUTION, NON-ORAL OPHTHALMIC (EYE) AS NEEDED
Status: DISCONTINUED | OUTPATIENT
Start: 2024-10-14 | End: 2024-10-14 | Stop reason: HOSPADM

## 2024-10-14 RX ORDER — PREDNISOLONE ACETATE 10 MG/ML
1 SUSPENSION/ DROPS OPHTHALMIC 4 TIMES DAILY
Qty: 10 ML | Refills: 2 | Status: SHIPPED | OUTPATIENT
Start: 2024-10-14 | End: 2024-11-13

## 2024-10-14 RX ORDER — ONDANSETRON HYDROCHLORIDE 2 MG/ML
4 INJECTION, SOLUTION INTRAVENOUS ONCE AS NEEDED
Status: DISCONTINUED | OUTPATIENT
Start: 2024-10-14 | End: 2024-10-14 | Stop reason: HOSPADM

## 2024-10-14 RX ORDER — ALBUTEROL SULFATE 0.83 MG/ML
2.5 SOLUTION RESPIRATORY (INHALATION) ONCE AS NEEDED
Status: DISCONTINUED | OUTPATIENT
Start: 2024-10-14 | End: 2024-10-14 | Stop reason: HOSPADM

## 2024-10-14 RX ORDER — LIDOCAINE HYDROCHLORIDE 20 MG/ML
INJECTION, SOLUTION INFILTRATION; PERINEURAL AS NEEDED
Status: DISCONTINUED | OUTPATIENT
Start: 2024-10-14 | End: 2024-10-14 | Stop reason: HOSPADM

## 2024-10-14 RX ORDER — OFLOXACIN 3 MG/ML
1 SOLUTION/ DROPS OPHTHALMIC 4 TIMES DAILY
Qty: 5 ML | Refills: 0 | Status: SHIPPED | OUTPATIENT
Start: 2024-10-14 | End: 2024-10-21

## 2024-10-14 RX ORDER — BUPIVACAINE HYDROCHLORIDE 7.5 MG/ML
INJECTION, SOLUTION EPIDURAL; RETROBULBAR AS NEEDED
Status: DISCONTINUED | OUTPATIENT
Start: 2024-10-14 | End: 2024-10-14 | Stop reason: HOSPADM

## 2024-10-14 RX ORDER — PROPARACAINE HYDROCHLORIDE 5 MG/ML
1 SOLUTION/ DROPS OPHTHALMIC ONCE
Status: COMPLETED | OUTPATIENT
Start: 2024-10-14 | End: 2024-10-14

## 2024-10-14 RX ORDER — MIDAZOLAM HYDROCHLORIDE 1 MG/ML
INJECTION, SOLUTION INTRAMUSCULAR; INTRAVENOUS AS NEEDED
Status: DISCONTINUED | OUTPATIENT
Start: 2024-10-14 | End: 2024-10-14

## 2024-10-14 RX ORDER — DEXAMETHASONE SODIUM PHOSPHATE 10 MG/ML
INJECTION INTRAMUSCULAR; INTRAVENOUS AS NEEDED
Status: DISCONTINUED | OUTPATIENT
Start: 2024-10-14 | End: 2024-10-14 | Stop reason: HOSPADM

## 2024-10-14 RX ORDER — LIDOCAINE IN NACL,ISO-OSMOT/PF 30 MG/3 ML
0.1 SYRINGE (ML) INJECTION ONCE
Status: DISCONTINUED | OUTPATIENT
Start: 2024-10-14 | End: 2024-10-14 | Stop reason: HOSPADM

## 2024-10-14 RX ORDER — FENTANYL CITRATE 50 UG/ML
INJECTION, SOLUTION INTRAMUSCULAR; INTRAVENOUS AS NEEDED
Status: DISCONTINUED | OUTPATIENT
Start: 2024-10-14 | End: 2024-10-14

## 2024-10-14 RX ORDER — INDOCYANINE GREEN AND WATER 25 MG
KIT INJECTION AS NEEDED
Status: DISCONTINUED | OUTPATIENT
Start: 2024-10-14 | End: 2024-10-14 | Stop reason: HOSPADM

## 2024-10-14 RX ORDER — LABETALOL HYDROCHLORIDE 5 MG/ML
5 INJECTION, SOLUTION INTRAVENOUS ONCE AS NEEDED
Status: DISCONTINUED | OUTPATIENT
Start: 2024-10-14 | End: 2024-10-14 | Stop reason: HOSPADM

## 2024-10-14 RX ORDER — METOCLOPRAMIDE HYDROCHLORIDE 5 MG/ML
10 INJECTION INTRAMUSCULAR; INTRAVENOUS ONCE AS NEEDED
Status: DISCONTINUED | OUTPATIENT
Start: 2024-10-14 | End: 2024-10-14 | Stop reason: HOSPADM

## 2024-10-14 RX ORDER — FENTANYL CITRATE 50 UG/ML
50 INJECTION, SOLUTION INTRAMUSCULAR; INTRAVENOUS EVERY 5 MIN PRN
Status: DISCONTINUED | OUTPATIENT
Start: 2024-10-14 | End: 2024-10-14 | Stop reason: HOSPADM

## 2024-10-14 RX ORDER — TROPICAMIDE 10 MG/ML
1 SOLUTION/ DROPS OPHTHALMIC
Status: COMPLETED | OUTPATIENT
Start: 2024-10-14 | End: 2024-10-14

## 2024-10-14 RX ORDER — FENTANYL CITRATE 50 UG/ML
25 INJECTION, SOLUTION INTRAMUSCULAR; INTRAVENOUS EVERY 5 MIN PRN
Status: DISCONTINUED | OUTPATIENT
Start: 2024-10-14 | End: 2024-10-14 | Stop reason: HOSPADM

## 2024-10-14 RX ORDER — TRIAMCINOLONE ACETONIDE 40 MG/ML
INJECTION, SUSPENSION INTRA-ARTICULAR; INTRAMUSCULAR AS NEEDED
Status: DISCONTINUED | OUTPATIENT
Start: 2024-10-14 | End: 2024-10-14 | Stop reason: HOSPADM

## 2024-10-14 RX ORDER — ACETAMINOPHEN 325 MG/1
650 TABLET ORAL EVERY 4 HOURS PRN
Status: DISCONTINUED | OUTPATIENT
Start: 2024-10-14 | End: 2024-10-14 | Stop reason: HOSPADM

## 2024-10-14 ASSESSMENT — PAIN - FUNCTIONAL ASSESSMENT
PAIN_FUNCTIONAL_ASSESSMENT: 0-10

## 2024-10-14 ASSESSMENT — COLUMBIA-SUICIDE SEVERITY RATING SCALE - C-SSRS
1. IN THE PAST MONTH, HAVE YOU WISHED YOU WERE DEAD OR WISHED YOU COULD GO TO SLEEP AND NOT WAKE UP?: NO
6. HAVE YOU EVER DONE ANYTHING, STARTED TO DO ANYTHING, OR PREPARED TO DO ANYTHING TO END YOUR LIFE?: NO
2. HAVE YOU ACTUALLY HAD ANY THOUGHTS OF KILLING YOURSELF?: NO

## 2024-10-14 ASSESSMENT — PAIN SCALES - GENERAL
PAINLEVEL_OUTOF10: 0 - NO PAIN

## 2024-10-14 NOTE — OP NOTE
vitrectomy, membrane peel, gas, ENDOLASER GAS (R) Operative Note     Date: 10/14/2024  OR Location: [facility] [place] [facility] [place]    Name: Ronna Elizalde YOB: 1950, Age: 74 y.o., MRN: [id number], Sex: female    Diagnosis  Pre-op Diagnosis      * Macular hole, right eye [H35.341] Post-op Diagnosis     * Macular hole, right eye [H35.341]     * Retinal hole of right eye [H33.321]     * Epiretinal membrane (ERM) of right eye [H35.371]     Procedures  25 gauge pars plana vitrectomy, membrane peel, endolaser, air-fluid exchange, 22% SF6 gas - right eye  [address]32 - [place] VITRECTOMY PARS PLANA REMOVE INT MEMB RETINA    Surgeons      * Sabina Parker - Primary    Resident/Fellow/Other Assistant:  Surgeons and Role:     * Kyle Matthew MD - Assisting    Procedure Summary  Anesthesia: Monitor Anesthesia Care  ASA: II  Anesthesia Staff: Anesthesiologist: Desiree Bennett DO  C-AA: TARYN Lozada  DAVID: Tina Jane    Estimated Blood Loss: 0 mL  Intra-op Medications:   Administrations occurring from 1055 to 1305 on 10/14/24:   Medication Name Total Dose   bupivacaine PF (Marcaine) injection 0.75 % 3.5 mL   balanced salts (BSS) intraocular solution 15 mL   balanced salts (BSS Plus) intraocular solution 350 mL   dexAMETHasone (Decadron) injection 5 mg   tobramycin-dexamethasone (Tobradex) ophthalmic ointment 0.5 inch   povidone-iodine 5 % ophthalmic solution 1 Application   indocyanine green (IC-Green) injection 1 mg   triamcinolone acetonide (Kenalog-40) injection 5 mg   chondroitin sulf-sod hyaluron (Viscoat) intraocular injection 0.75 mL   lidocaine (Xylocaine) 20 mg/mL (2 %) injection 3.5 mL   phenylephrine (Mydfrin) 2.5 % ophthalmic solution 1 drop 3 drop   proparacaine (Alcaine) 0.5 % ophthalmic solution 1 drop 1 drop   tropicamide (Mydriacyl) 1 % ophthalmic solution 1 drop 3 drop          Anesthesia Record               Intraprocedure I/O Totals       None           Specimen: No specimens collected     Staff:   Circulator:  Sunita  Scrub Person: Petra  Jl Circulator: Traci         Drains and/or Catheters: * None in log *    Tourniquet Times: n/a        Implants:  Implants       Type Name Action Serial No.      Oxygen And Other Non Anesth Gas GAS, INTRAOCULAR, 10 CC, DOSE TANK - [id number] Implanted             Findings: full thickness macular hole, epiretinal membrane, peripheral retinal hole - right eye    Indications: Ronna Elizalde is an 74 y.o. female who is having surgery for Macular hole, right eye [H35.341].     The patient was seen in the preoperative area. The risks, benefits, complications, treatment options, non-operative alternatives, expected recovery and outcomes were discussed with the patient. The possibilities of reaction to medication, pulmonary aspiration, injury to surrounding structures, bleeding, recurrent infection, the need for additional procedures, failure to diagnose a condition, and creating a complication requiring transfusion or operation were discussed with the patient. The patient concurred with the proposed plan, giving informed consent.  The site of surgery was properly noted/marked if necessary per policy. The patient has been actively warmed in preoperative area. Preoperative antibiotics are not indicated. Venous thrombosis prophylaxis are not indicated.    Procedure Details:     DATE OF SURGERY: 10/14/2024    SURGEON: Sabina Parker MD    ASSISTANT: Klye Matthew MD    PREOPERATIVE DIAGNOSIS  Pre-Op Diagnosis Codes:      * Macular hole, right eye [H35.341]    POSTOPERATIVE DIAGNOSIS   Post-op Diagnosis     * Macular hole, right eye [H35.341]     * Retinal hole of right eye [H33.321]     * Epiretinal membrane (ERM) of right eye [H35.371]    OPERATION PERFORMED  Repair of macular hole with:  25-gauge pars plana vitrectomy, membrane peel, endolaser. fluid-air exchange, and injection of 22% SF6 gas to the right eye    ANESTHESIA  Monitored anesthesia care with a standard block consisting of a  1:1 mixture of 4 %  lidocaine and 0.75% Marcaine with Wydase     FINDINGS  As detailed below     COMPLICATIONS  None     ESTIMATED BLOOD LOSS   Minimal     SPECIMENS REMOVED  None     JUSTIFICATION  Ms. Elizalde is a 74 y.o. female with full thickness macular hole, epiretinal membrane, peripheral retinal hole - right eye. This was causing decreased visual function. The risks, benefits, and alternatives to the procedure were discussed with the patient including the risk for vision loss, blindness, retinal detachment, infection, bleeding, pain, high or low pressure in the eye, double vision, no benefit, need for additional procedures, and droopy eyelid, amongst others. The patient had a full opportunity to have all questions answered in clinic prior to surgery. Afterwards, the patient requested that we perform surgery and signed the consent form.     PROCEDURE:   The patient was brought to the preoperative holding area where the correct eye was confirmed and marked. The patient was then brought to the operating room where a secondary time-out was performed to identify the correct patient, eye, procedure, and any allergies. The patient then underwent intravenous sedation by the anesthesia team followed by a block as described above. The eye was prepped and draped in the usual sterile ophthalmic fashion followed by placement of a lid speculum.     A 25-gauge trocar was placed in the inferotemporal quadrant 4.0 mm posterior to the limbus after conjunctival displacement.  A 4 mm infusion cannula was placed through this trocar, and the infusion cannula was confirmed in the vitreous cavity prior to turning it on. Two additional  25 -gauge trocars were placed in a similar fashion in the superonasal and superotemporal quadrants 4.0 mm posterior to the limbus after conjunctival displacement.     At this time, a standard three-port pars plana vitrectomy was performed using the light pipe, the cutter, and the BIOM viewing system.  A core vitreous dissection was performed. The retina was inspected and was found to have a full thickness macular hole and epiretinal membrane. A prior posterior vitreous detachment was confirmed with aspiration over the optic nerve.  A thorough peripheral vitreous dissection was performed. The ILM and ERM were peeled off the surface of the macula in the posterior pole with ILM forceps, after injection of diluted Kenalog and ICG for improved visualization.    The peripheral retina was inspected with scleral depression and a small round retinal hole adjacent to pars plana cysts was found superiorly at 11:30. The endolaser as brought into the filed and 2-3 rows of barrier laser were applied circumferentially around the retinal hole. Laser Settings: Power: 250mW, Duration: 100ms, Interval: 100ms, Shot Count: 84, Total energy: 1.82 Joules.    A complete fluid-air exchange was performed using a soft tip cannula. Residual fluid was removed from the posterior pole over the optic nerve.      The three trocar cannulas were sequentially removed and their respective sclerotomy sites were sutured with a single 6-0 plain gut suture. A two needle technique was then employed to perform the 22%  SF6 gas exchange; gas was injected into the posterior segment with a 30 gauge needle on a 60cc gas syringe and air was vented from the posterior segment via 27 gauge needle on a plunger less 3cc syringe filled with BSS. After completion of complete gas exchange both needles were removed from the globe simultaneously; there were no leaks. The eye was confirmed to be at a physiologic level by digital palpation after removal of the needles.    Subconjunctival injection of Dexamethasone was administered. The lid speculum and drapes were removed. Antibiotic ointment was applied. The eye was patched and shielded. The patient tolerated the procedure well without any intraoperative or immediate postoperative complications. The patient was taken to  the recovery room in good condition. The patient was instructed to maintain a strict face-down position. The patient will follow up with Sabina Parker MD in clinic on the following morning.     Complications:  None; patient tolerated the procedure well.    Disposition: PACU - hemodynamically stable.  Condition: stable     Additional Details:     A qualified resident physician was not available and the use of a skilled assistant surgeon, Kyle Matthew MD, was required for the following portions of this case: 25 gauge pars plana vitrectomy, membrane peel, endolaser, air-fluid exchange, 22% SF6 gas - right eye      Attending Attestation:     Sabina Parker  Phone Number: [phone]

## 2024-10-14 NOTE — BRIEF OP NOTE
Date: 10/14/2024  OR Location: [facility] [place] [facility] OR    Name: Ronna Elizalde, : 1950, Age: 74 y.o., MRN: [id number], Sex: female    Diagnosis  Pre-op Diagnosis      * Macular hole, right eye [H35.341] Post-op Diagnosis     * Macular hole, right eye [H35.341]     * Retinal hole of right eye [H33.321]     Procedures  25 gauge pars plana vitrectomy, membrane peel, endolaser, air-fluid exchange, 22% SF6 gas - right eye  34598 - CT VITRECTOMY PARS PLANA REMOVE INT MEMB RETINA      Surgeons      * Sabina Parker - Primary    Resident/Fellow/Other Assistant:  Surgeons and Role:     * Kyle Matthew MD - Assisting    Procedure Summary  Anesthesia: Monitor Anesthesia Care  ASA: II  Anesthesia Staff: Anesthesiologist: DO CRISTOBAL Calvillo-AA: TARYN Lozada  DAVID: Tina Jane  Estimated Blood Loss: 0 mL  Intra-op Medications:   Administrations occurring from 1055 to 1305 on 10/14/24:   Medication Name Total Dose   bupivacaine PF (Marcaine) injection 0.75 % 3.5 mL   balanced salts (BSS) intraocular solution 15 mL   balanced salts (BSS Plus) intraocular solution 350 mL   dexAMETHasone (Decadron) injection 5 mg   tobramycin-dexamethasone (Tobradex) ophthalmic ointment 0.5 inch   povidone-iodine 5 % ophthalmic solution 1 Application   indocyanine green (IC-Green) injection 1 mg   triamcinolone acetonide (Kenalog-40) injection 5 mg   chondroitin sulf-sod hyaluron (Viscoat) intraocular injection 0.75 mL   lidocaine (Xylocaine) 20 mg/mL (2 %) injection 3.5 mL   phenylephrine (Mydfrin) 2.5 % ophthalmic solution 1 drop 3 drop   proparacaine (Alcaine) 0.5 % ophthalmic solution 1 drop 1 drop   tropicamide (Mydriacyl) 1 % ophthalmic solution 1 drop 3 drop              Anesthesia Record               Intraprocedure I/O Totals       None           Specimen: No specimens collected     Staff:   Circulator: Sunita Guzmanub Person: Petra Parikh Circulator: Traci    Findings: full thickness macular hole , retinal hole - right eye.      Complications:  None; patient tolerated the procedure well.     Disposition: PACU - hemodynamically stable.  Condition: stable  Specimens Collected: No specimens collected    Attending Attestation:     A qualified resident physician was not available and the use of a skilled assistant surgeon, Kyle Matthew MD, was required for the following portions of this case: 25 gauge pars plana vitrectomy, membrane peel, endolaser, air-fluid exchange, 22% SF6 gas - right eye        Sabina Edwardsn  Phone Number: [phone]

## 2024-10-14 NOTE — DISCHARGE INSTRUCTIONS
"Please keep the eye patched/shielded until your post op day 1 appointment tomorrow.    Appointment:  {blank single:[phone]::\"[place]\",\"[place]\",\"[place]\",\"[place]\",\"[place]\"}[facility], ***    Please do not perform any strenuous activity, bending below the waist, until you are cleared by your doctor.     Post Operative Medications: You will start your eye drops tomorrow after your post operative day 1 appointment   Antibiotic (Tan Cap) - 4 times per day              Steroid (Pink Cap) - 4 times per day   The order of drop instillation does not matter but you must wait atleast 5 minutes in between drops to ensure that the medications absorb properly    You have gas in the the eye after surgery. You are not permitted to fly in airplanes or experience any significant change in elevation until your are cleared by your doctor. You must wear your gas bracelet until your are cleared by your doctor.     Positioning: Please maintain strict face down positioning during the day for the next 24 hours. You may take one 10-15 break per hour to sit upright/stand to eat and use the bathroom. When sitting or standing upright, look down at the floor to maintain facedown positioning.  At night, lay on your left side with  your face down and into the pillow. DO NOT LIE FLAT ON YOUR BACK at any time while gas is in hour eye  "

## 2024-10-14 NOTE — H&P
History Of Present Illness  Ronna Elizalde is a 74 y.o. female presenting with right eye macular hole .     Past Medical History  Past Medical History:   Diagnosis Date    Adverse effect of anesthesia     had emergence delirium from general anesthesia    Age-related nuclear cataract, left eye 06/22/2021    Cataract, nuclear sclerotic, left eye    Arthritis     Migraines     Personal history of other diseases of the nervous system and sense organs     History of keratitis    Sciatica     Unspecified disorder of eyelid     Eyelid lesion       Surgical History  Past Surgical History:   Procedure Laterality Date    CATARACT EXTRACTION Left     EYE SURGERY Left     vitrectomy        Social History  She reports that she has never smoked. She has never used smokeless tobacco. She reports that she does not currently use alcohol. She reports that she does not use drugs.    Family History  Family History   Problem Relation Name Age of Onset    Diabetes Father          Allergies  Penicillins    Current Outpatient Medications   Medication Instructions    cholecalciferol (VITAMIN D-3) 2,000 Units, oral, Daily    dorzolamide (Trusopt) 2 % ophthalmic solution Use one drop every 12 hours right eye. 45 day supply.    ketorolac (Acular) 0.5 % ophthalmic solution Use one drop 4 times a day in the right eye      Review of Systems  Patient denies ocular pain, redness, discharge, decreased vision, double vision, blind spots, flashes, or floaters.        Physical Exam  Constitutional: No acute distress   HEENT: mucus membranes moist, atraumatic   Respiratory: no respiratory distress   Cardiovascular: no peripheral edema  Abdomen: non distended   MSK/neuro: moves all extremities spontaneously   Skin: no rashes   Psych: alert and oriented       Last Recorded Vitals  Height 1.524 m (5'), weight 65.3 kg (144 lb).    Relevant Results             Assessment/Plan   Assessment & Plan  Macular hole, right eye      Plan right eye pars plana  vitrectomy (PPV)/membrane peel (MP)/gas            Brayden Koehler MD

## 2024-10-14 NOTE — ANESTHESIA POSTPROCEDURE EVALUATION
Patient: Ronna Elizalde    Procedure Summary       Date: 10/14/24 Room / Location: [facility] [place] [facility] OR 04 / [facility] [place] [facility] OR    Anesthesia Start: 1119 Anesthesia Stop: 1239    Procedure: vitrectomy, membrane peel, gas, ENDOLASER GAS (Right: Eye) Diagnosis:       Macular hole, right eye      Retinal hole of right eye      Epiretinal membrane (ERM) of right eye      (Macular hole, right eye [H35.341])    Surgeons: Sabina Parker MD PhD Responsible Provider: Desiree Bennett DO    Anesthesia Type: MAC ASA Status: 2            Anesthesia Type: MAC    Vitals Value Taken Time   /65 10/14/24 1237   Temp 36.3 °C (97.3 °F) 10/14/24 1237   Pulse 97 10/14/24 1237   Resp 16 10/14/24 1237   SpO2 99 % 10/14/24 1237       Anesthesia Post Evaluation    Patient location during evaluation: PACU  Patient participation: complete - patient participated  Level of consciousness: awake  Pain management: satisfactory to patient  Multimodal analgesia pain management approach  Airway patency: patent  Cardiovascular status: acceptable  Respiratory status: acceptable  Hydration status: acceptable  Postoperative Nausea and Vomiting: none    No notable events documented.

## 2024-10-14 NOTE — ANESTHESIA PREPROCEDURE EVALUATION
Patient: Ronna Elizalde    Procedure Information       Date/Time: 10/14/24 1055    Procedure: vitrectomy, membrane peel, gas (Right)    Location: [facility] [place] [place] [facility] OR 04 / [facility] [place] [place] [facility] OR    Surgeons: Sabina Parker MD PhD            Relevant Problems   Anesthesia (within normal limits)      Cardiac (within normal limits)  4 mets      Pulmonary (within normal limits)      Neuro   (+) History of migraine headaches      GI   (+) Gastroesophageal reflux disease (Occ heartburn prn TUMs only with certain foods, nausea this morning)      /Renal (within normal limits)      Liver (within normal limits)      Endocrine (within normal limits)      Hematology (within normal limits)      Musculoskeletal (within normal limits)      HEENT (within normal limits)      ID (within normal limits)      Skin (within normal limits)      GYN (within normal limits)       Clinical information reviewed:   Tobacco  Allergies  Meds   Med Hx  Surg Hx   Fam Hx  Soc Hx        NPO Detail:  NPO/Void Status  Date of Last Liquid: 10/13/24  Time of Last Liquid: 2200  Date of Last Solid: 10/13/24  Time of Last Solid: 2015         Physical Exam    Airway  Mallampati: III  TM distance: >3 FB  Neck ROM: full     Cardiovascular    Dental     Comments: 9 bonded   Pulmonary    Abdominal        Anesthesia Plan    History of general anesthesia?: yes  History of complications of general anesthesia?: no    ASA 2     MAC     intravenous induction   Anesthetic plan and risks discussed with patient.    Plan discussed with CAA.

## 2024-10-15 ENCOUNTER — APPOINTMENT (OUTPATIENT)
Dept: OPHTHALMOLOGY | Facility: CLINIC | Age: 74
End: 2024-10-15
Payer: MEDICARE

## 2024-10-15 DIAGNOSIS — H35.341 MACULAR HOLE, RIGHT EYE: Primary | ICD-10-CM

## 2024-10-15 PROCEDURE — 99024 POSTOP FOLLOW-UP VISIT: CPT

## 2024-10-15 ASSESSMENT — ENCOUNTER SYMPTOMS
MUSCULOSKELETAL NEGATIVE: 0
HEMATOLOGIC/LYMPHATIC NEGATIVE: 0
EYES NEGATIVE: 0
ALLERGIC/IMMUNOLOGIC NEGATIVE: 0
CARDIOVASCULAR NEGATIVE: 0
NEUROLOGICAL NEGATIVE: 0
CONSTITUTIONAL NEGATIVE: 0
GASTROINTESTINAL NEGATIVE: 0
ENDOCRINE NEGATIVE: 0
PSYCHIATRIC NEGATIVE: 0
RESPIRATORY NEGATIVE: 0

## 2024-10-15 ASSESSMENT — VISUAL ACUITY
METHOD: SNELLEN - LINEAR
OD_CC: HM
CORRECTION_TYPE: GLASSES

## 2024-10-15 ASSESSMENT — TONOMETRY
OD_IOP_MMHG: 20
IOP_METHOD: TONOPEN

## 2024-10-15 ASSESSMENT — CONF VISUAL FIELD
OD_SUPERIOR_TEMPORAL_RESTRICTION: 0
OD_INFERIOR_TEMPORAL_RESTRICTION: 0
OS_INFERIOR_TEMPORAL_RESTRICTION: 0
OS_SUPERIOR_NASAL_RESTRICTION: 0
OS_NORMAL: 1
OS_SUPERIOR_TEMPORAL_RESTRICTION: 0
OD_SUPERIOR_NASAL_RESTRICTION: 0
OD_NORMAL: 1
OD_INFERIOR_NASAL_RESTRICTION: 0
OS_INFERIOR_NASAL_RESTRICTION: 0

## 2024-10-15 ASSESSMENT — CUP TO DISC RATIO
OS_RATIO: 0.3
OD_RATIO: 0.3

## 2024-10-15 ASSESSMENT — EXTERNAL EXAM - LEFT EYE: OS_EXAM: NORMAL

## 2024-10-15 ASSESSMENT — EXTERNAL EXAM - RIGHT EYE: OD_EXAM: NORMAL

## 2024-10-15 NOTE — PROGRESS NOTES
# Macular hole, right eye  # Epiretinal membrane (ERM) of right eye  - S/P PPV MP EL SF6  - POD 1  - Doing well  - IOP WNL  - Retina is flat  - No signs of infection  - Fibrin in the AC    Plan:  - Prednisolone 6x and ofloxacin QID  - RTC in 1 week  - Endopthalmitis and RD precaution return precautions discussed, including pain, redness, decreased vision, and flashes/floaters.   - Instructions:  -- No heavy lifting/bending/straining  -- Wear shield while sleeping, glasses during day    # Macular hole left eye  - s/p PPV ICG ILM Peel FAX 20% SF6 OS ([place] 11/09/20)  - s/p Phaco IOL OS  ([place])  - s/p Yag OS  - Retina attached in all 4 quadrants, hole closed with persistent residual CME    OCT : 10/15/24     OD: small full thickness macular hole with adjacent intraretinal cystic spaces, epiretinal membrane (ERM)  OS: loss of foveal contour, cystic intraretinal space parafoveal, normal EZ; stable over the past few year    Plan:   - FTMH is very small; will try medical management of first with dorzolamide BID OD, Durezol TID OD, and Ketorolac QID OD; still persistent ; will try another month considering subjective improvement. Schedule surgery in case no closure by next visit  - If issues with insurance not covering Durezol, will prescribe pred QID OD instead   - If macular hole OD does not close, will consider surgery   - RTC in 4 weeks     # Refractive error  - Wears contacts and glasses   - Follows with Dr. De Jesus

## 2024-10-23 ENCOUNTER — APPOINTMENT (OUTPATIENT)
Dept: OPHTHALMOLOGY | Facility: CLINIC | Age: 74
End: 2024-10-23
Payer: MEDICARE

## 2024-10-23 DIAGNOSIS — H35.342 MACULAR HOLE, LEFT: ICD-10-CM

## 2024-10-23 DIAGNOSIS — H35.371 EPIRETINAL MEMBRANE (ERM) OF RIGHT EYE: ICD-10-CM

## 2024-10-23 DIAGNOSIS — H35.341 MACULAR HOLE, RIGHT EYE: Primary | ICD-10-CM

## 2024-10-23 DIAGNOSIS — H43.811 POSTERIOR VITREOUS DETACHMENT OF RIGHT EYE: ICD-10-CM

## 2024-10-23 DIAGNOSIS — H35.351 CYSTOID MACULAR EDEMA, RIGHT EYE: ICD-10-CM

## 2024-10-23 PROCEDURE — 99024 POSTOP FOLLOW-UP VISIT: CPT

## 2024-10-23 PROCEDURE — 92134 CPTRZ OPH DX IMG PST SGM RTA: CPT

## 2024-10-23 RX ORDER — KETOROLAC TROMETHAMINE 5 MG/ML
1 SOLUTION OPHTHALMIC 4 TIMES DAILY
Qty: 5 ML | Refills: 0 | Status: SHIPPED | OUTPATIENT
Start: 2024-10-23 | End: 2024-11-02

## 2024-10-23 RX ORDER — PREDNISOLONE ACETATE 10 MG/ML
1 SUSPENSION/ DROPS OPHTHALMIC 4 TIMES DAILY
Qty: 5 ML | Refills: 0 | Status: SHIPPED | OUTPATIENT
Start: 2024-10-23 | End: 2024-11-06

## 2024-10-23 ASSESSMENT — VISUAL ACUITY
OS_CC+: +1
METHOD: SNELLEN - LINEAR
OD_CC: 20/70
OD_PH_CC+: -2
OD_CC+: +1
OS_PH_CC: 20/30
OS_CC: 20/40
OS_PH_CC+: -2
OD_PH_CC: 20/50

## 2024-10-23 ASSESSMENT — CUP TO DISC RATIO
OS_RATIO: 0.3
OD_RATIO: 0.3

## 2024-10-23 ASSESSMENT — ENCOUNTER SYMPTOMS
NEUROLOGICAL NEGATIVE: 0
ALLERGIC/IMMUNOLOGIC NEGATIVE: 0
EYES NEGATIVE: 1
CONSTITUTIONAL NEGATIVE: 0
RESPIRATORY NEGATIVE: 0
HEMATOLOGIC/LYMPHATIC NEGATIVE: 0
ENDOCRINE NEGATIVE: 0
GASTROINTESTINAL NEGATIVE: 0
PSYCHIATRIC NEGATIVE: 0
CARDIOVASCULAR NEGATIVE: 0
MUSCULOSKELETAL NEGATIVE: 0

## 2024-10-23 ASSESSMENT — TONOMETRY
IOP_METHOD: TONOPEN
OD_IOP_MMHG: 23
OS_IOP_MMHG: 19

## 2024-10-23 ASSESSMENT — EXTERNAL EXAM - RIGHT EYE: OD_EXAM: NORMAL

## 2024-10-23 ASSESSMENT — EXTERNAL EXAM - LEFT EYE: OS_EXAM: NORMAL

## 2024-10-23 NOTE — PROGRESS NOTES
# Macular hole, right eye  # Epiretinal membrane (ERM) of right eye  - S/P PPV MP EL SF6 10/14/24  - POW 1  - Doing well, hole is closed; residual subfoveal lucency.  - IOP a little elevated today  - Retina is flat  - No signs of infection  - Fibrin in the AC    Plan:  - Prednisolone 4x and stop ofloxacin   - Start ketorolac QID  - RTC in 1 month   - Endopthalmitis and RD precaution return precautions discussed, including pain, redness, decreased vision, and flashes/floaters.   - Discussed positioning until bubble is gone  - Instructions:  -- No heavy lifting/bending/straining  -- Wear shield while sleeping, glasses during day    # Macular hole left eye  - s/p PPV ICG ILM Peel FAX 20% SF6 OS ([facility] 11/09/20)  - s/p Phaco IOL OS  ([place])  - s/p Yag OS  - Retina attached in all 4 quadrants, hole closed with persistent residual CME    OCT : 10/23/24     OD:  slightly blunted foveal contour, small PED vs SRF, no IRF  OS: loss of foveal contour, cystic intraretinal space parafoveal, normal EZ; stable over the past few year    # Refractive error  - Wears contacts and glasses   - Follows with Dr. De Jesus

## 2024-10-28 ENCOUNTER — APPOINTMENT (OUTPATIENT)
Dept: OPHTHALMOLOGY | Facility: CLINIC | Age: 74
End: 2024-10-28
Payer: MEDICARE

## 2024-11-27 ENCOUNTER — APPOINTMENT (OUTPATIENT)
Dept: OPHTHALMOLOGY | Facility: CLINIC | Age: 74
End: 2024-11-27
Payer: MEDICARE

## 2024-11-27 DIAGNOSIS — H35.341 MACULAR HOLE, RIGHT EYE: Primary | ICD-10-CM

## 2024-11-27 PROCEDURE — 1160F RVW MEDS BY RX/DR IN RCRD: CPT

## 2024-11-27 PROCEDURE — 99024 POSTOP FOLLOW-UP VISIT: CPT

## 2024-11-27 PROCEDURE — 92134 CPTRZ OPH DX IMG PST SGM RTA: CPT

## 2024-11-27 PROCEDURE — 1159F MED LIST DOCD IN RCRD: CPT

## 2024-11-27 PROCEDURE — 1036F TOBACCO NON-USER: CPT

## 2024-11-27 RX ORDER — PREDNISOLONE ACETATE 10 MG/ML
SUSPENSION/ DROPS OPHTHALMIC
COMMUNITY
Start: 2024-10-15

## 2024-11-27 ASSESSMENT — ENCOUNTER SYMPTOMS
GASTROINTESTINAL NEGATIVE: 0
EYES NEGATIVE: 0
PSYCHIATRIC NEGATIVE: 0
CONSTITUTIONAL NEGATIVE: 0
CARDIOVASCULAR NEGATIVE: 0
ALLERGIC/IMMUNOLOGIC NEGATIVE: 0
NEUROLOGICAL NEGATIVE: 0
ENDOCRINE NEGATIVE: 0
RESPIRATORY NEGATIVE: 0
MUSCULOSKELETAL NEGATIVE: 0
HEMATOLOGIC/LYMPHATIC NEGATIVE: 0

## 2024-11-27 ASSESSMENT — VISUAL ACUITY
OD_CC: 20/40
METHOD: SNELLEN - LINEAR
OD_PH_CC: 20/30
CORRECTION_TYPE: GLASSES
OS_CC: 20/40

## 2024-11-27 ASSESSMENT — EXTERNAL EXAM - RIGHT EYE: OD_EXAM: NORMAL

## 2024-11-27 ASSESSMENT — TONOMETRY
OD_IOP_MMHG: 16
IOP_METHOD: GOLDMANN APPLANATION
OS_IOP_MMHG: 13

## 2024-11-27 ASSESSMENT — EXTERNAL EXAM - LEFT EYE: OS_EXAM: NORMAL

## 2024-11-27 ASSESSMENT — CUP TO DISC RATIO
OD_RATIO: 0.3
OS_RATIO: 0.3

## 2024-11-27 NOTE — PROGRESS NOTES
# Macular hole, right eye  # Epiretinal membrane (ERM) of right eye  - S/P PPV MP EL SF6 10/14/24  - POM 1  - Doing well, hole is closed; residual subfoveal lucency.  - IOP a little elevated today  - Retina is flat  - No signs of infection  - Fibrin in the AC    Plan:  - Taper prednisolone 4x and ketorolac   - RTC in 2 month   - Endopthalmitis and RD precaution return precautions discussed, including pain, redness, decreased vision, and flashes/floaters.   - Discussed positioning until bubble is gone  - Instructions:  -- No heavy lifting/bending/straining  -- Wear shield while sleeping, glasses during day    # Macular hole left eye  - s/p PPV ICG ILM Peel FAX 20% SF6 OS ([facility] 11/09/20)  - s/p Phaco IOL OS  ([place])  - s/p Yag OS  - Retina attached in all 4 quadrants, hole closed with persistent residual CME    OCT : 11/27/24     OD:  slightly blunted foveal contour, small PED vs SRF, no IRF  OS: loss of foveal contour, cystic intraretinal space parafoveal, normal EZ; stable over the past few year    # Refractive error  - Wears contacts and glasses   - Follows with Dr. De Jesus

## 2024-12-04 ENCOUNTER — APPOINTMENT (OUTPATIENT)
Dept: OPHTHALMOLOGY | Facility: CLINIC | Age: 74
End: 2024-12-04
Payer: MEDICARE

## 2025-02-06 ENCOUNTER — APPOINTMENT (OUTPATIENT)
Dept: OPHTHALMOLOGY | Facility: CLINIC | Age: 75
End: 2025-02-06
Payer: MEDICARE

## 2025-02-06 DIAGNOSIS — H35.341 MACULAR HOLE, RIGHT EYE: Primary | ICD-10-CM

## 2025-02-06 DIAGNOSIS — H25.811 COMBINED FORM OF AGE-RELATED CATARACT, RIGHT EYE: ICD-10-CM

## 2025-02-06 DIAGNOSIS — H35.342 MACULAR HOLE, LEFT: ICD-10-CM

## 2025-02-06 PROCEDURE — 99213 OFFICE O/P EST LOW 20 MIN: CPT

## 2025-02-06 ASSESSMENT — REFRACTION_WEARINGRX
OD_CYLINDER: -1.00
OS_SPHERE: -1.50
OD_AXIS: 060
OS_AXIS: 025
OS_CYLINDER: -1.50
OD_SPHERE: -1.75
OD_ADD: +2.50
OS_ADD: +2.50

## 2025-02-06 ASSESSMENT — ENCOUNTER SYMPTOMS
NEUROLOGICAL NEGATIVE: 0
EYES NEGATIVE: 1
RESPIRATORY NEGATIVE: 0
CARDIOVASCULAR NEGATIVE: 0
ALLERGIC/IMMUNOLOGIC NEGATIVE: 0
MUSCULOSKELETAL NEGATIVE: 0
GASTROINTESTINAL NEGATIVE: 0
PSYCHIATRIC NEGATIVE: 0
HEMATOLOGIC/LYMPHATIC NEGATIVE: 0
CONSTITUTIONAL NEGATIVE: 0
ENDOCRINE NEGATIVE: 0

## 2025-02-06 ASSESSMENT — CUP TO DISC RATIO
OD_RATIO: 0.3
OS_RATIO: 0.3

## 2025-02-06 ASSESSMENT — VISUAL ACUITY
OD_CC: 20/70+2
METHOD: SNELLEN - LINEAR
OS_CC: 20/40+2
OD_PH_CC: 20/50-2
CORRECTION_TYPE: GLASSES

## 2025-02-06 ASSESSMENT — TONOMETRY
IOP_METHOD: GOLDMANN APPLANATION
OD_IOP_MMHG: 15
OS_IOP_MMHG: 14.5

## 2025-02-06 ASSESSMENT — EXTERNAL EXAM - RIGHT EYE: OD_EXAM: NORMAL

## 2025-02-06 ASSESSMENT — EXTERNAL EXAM - LEFT EYE: OS_EXAM: NORMAL

## 2025-02-06 NOTE — PROGRESS NOTES
OCT macula 02/06/25   OD:  slightly blunted foveal contour, residual subfoveal lucency improved from prior, no IRF/SRF  OS: loss of foveal contour, cystic intraretinal space parafoveal, normal EZ; stable over the past few year    # Macular hole, right eye  # Epiretinal membrane (ERM) of right eye  - S/P PPV MP EL SF6 10/14/24  - POM 4  - Doing well, hole is closed; residual subfoveal lucency; improved  - Retina is flat  - No signs of infection    # Macular hole left eye  - s/p PPV ICG ILM [place]el FAX 20% SF6 OS ([place] 11/09/20)  - s/p Phaco IOL OS  ([place])  - s/p Yag OS  - Retina attached in all 4 quadrants, hole closed with persistent residual CME    Plan  - MH closed  - Endopthalmitis and RD precaution return precautions discussed, including pain, redness, decreased vision, and flashes/floaters.   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 De Jesus

## 2025-03-14 ENCOUNTER — APPOINTMENT (OUTPATIENT)
Dept: OPHTHALMOLOGY | Facility: CLINIC | Age: 75
End: 2025-03-14
Payer: MEDICARE

## 2025-03-14 DIAGNOSIS — H25.811 COMBINED FORM OF AGE-RELATED CATARACT, RIGHT EYE: Primary | ICD-10-CM

## 2025-03-14 DIAGNOSIS — H35.371 EPIRETINAL MEMBRANE (ERM) OF RIGHT EYE: ICD-10-CM

## 2025-03-14 DIAGNOSIS — H35.342 MACULAR HOLE, LEFT: ICD-10-CM

## 2025-03-14 RX ORDER — TETRACAINE HYDROCHLORIDE 5 MG/ML
1 SOLUTION OPHTHALMIC ONCE
OUTPATIENT
Start: 2025-03-14 | End: 2025-03-14

## 2025-03-14 RX ORDER — CYCLOPENTOLATE HYDROCHLORIDE 10 MG/ML
1 SOLUTION/ DROPS OPHTHALMIC
OUTPATIENT
Start: 2025-03-14 | End: 2025-03-14

## 2025-03-14 RX ORDER — MOXIFLOXACIN 5 MG/ML
1 SOLUTION/ DROPS OPHTHALMIC
OUTPATIENT
Start: 2025-03-14 | End: 2025-03-14

## 2025-03-14 RX ORDER — PHENYLEPHRINE HYDROCHLORIDE 25 MG/ML
1 SOLUTION/ DROPS OPHTHALMIC
OUTPATIENT
Start: 2025-03-14 | End: 2025-03-14

## 2025-03-14 RX ORDER — TROPICAMIDE 10 MG/ML
1 SOLUTION/ DROPS OPHTHALMIC
OUTPATIENT
Start: 2025-03-14 | End: 2025-03-14

## 2025-03-14 ASSESSMENT — VISUAL ACUITY
OD_BAT_MED: 20/60
OD_PH_CC: 20/40
OD_CC: 20/70
OS_CC: 20/30
METHOD: SNELLEN - LINEAR
CORRECTION_TYPE: GLASSES

## 2025-03-14 ASSESSMENT — REFRACTION_MANIFEST
OS_CYLINDER: -1.25
OD_CYLINDER: -1.25
OS_CYLINDER: -1.25
OS_SPHERE: -2.00
OS_AXIS: 100
OS_AXIS: 101
OD_AXIS: 060
OD_AXIS: 075
OS_SPHERE: -1.75
OD_SPHERE: -3.75
OD_CYLINDER: -1.25
OD_SPHERE: -3.50
METHOD_AUTOREFRACTION: 1

## 2025-03-14 ASSESSMENT — ENCOUNTER SYMPTOMS
GASTROINTESTINAL NEGATIVE: 0
CARDIOVASCULAR NEGATIVE: 0
ALLERGIC/IMMUNOLOGIC NEGATIVE: 0
MUSCULOSKELETAL NEGATIVE: 0
CONSTITUTIONAL NEGATIVE: 0
EYES NEGATIVE: 1
ENDOCRINE NEGATIVE: 0
HEMATOLOGIC/LYMPHATIC NEGATIVE: 0
RESPIRATORY NEGATIVE: 0
PSYCHIATRIC NEGATIVE: 0
NEUROLOGICAL NEGATIVE: 0

## 2025-03-14 ASSESSMENT — REFRACTION_WEARINGRX
OD_CYLINDER: -0.25
OS_AXIS: 118
OS_CYLINDER: -0.75
OD_SPHERE: -2.00
OD_AXIS: 055
OS_ADD: +2.50
OS_SPHERE: -2.00
OD_ADD: +2.50

## 2025-03-14 ASSESSMENT — CONF VISUAL FIELD
OS_INFERIOR_TEMPORAL_RESTRICTION: 0
OD_NORMAL: 1
OS_SUPERIOR_NASAL_RESTRICTION: 0
OS_NORMAL: 1
OD_SUPERIOR_TEMPORAL_RESTRICTION: 0
OS_SUPERIOR_TEMPORAL_RESTRICTION: 0
OS_INFERIOR_NASAL_RESTRICTION: 0
OD_SUPERIOR_NASAL_RESTRICTION: 0
OD_INFERIOR_NASAL_RESTRICTION: 0
OD_INFERIOR_TEMPORAL_RESTRICTION: 0

## 2025-03-14 ASSESSMENT — EXTERNAL EXAM - LEFT EYE: OS_EXAM: NORMAL

## 2025-03-14 ASSESSMENT — CUP TO DISC RATIO
OD_RATIO: 0.3
OS_RATIO: 0.3

## 2025-03-14 ASSESSMENT — TONOMETRY
OD_IOP_MMHG: 16
IOP_METHOD: GOLDMANN APPLANATION
OS_IOP_MMHG: 16

## 2025-03-14 ASSESSMENT — EXTERNAL EXAM - RIGHT EYE: OD_EXAM: NORMAL

## 2025-03-14 NOTE — PROGRESS NOTES
Combined forms of age-related cataract of right eyeH25.811  Visually significant. Pt would like to proceed with surgery.    Visually significant cataract OD. BCVA: 20/40. Glare: 20/60. Symptoms: blurry vision, glare. A change in glasses prescription will not result in significant visual improvement at this time.  Indication for cataract surgery: Input To potentially improve visual acuity and improve quality of life/reduce symptoms.   Based on a comprehensive eye exam performed today, a visually significant cataract appears to be the source of decreased vision, diminished quality of life, and impairment of activities of daily living. Discussed option of cataract surgery vs observation. Patient can no longer function adequately with current best corrected visual acuity and wishes to have cataract surgery at this time. Discussed surgical procedure with patient. As a result of cataract extraction, it is believed that the patient will experience improved vision. Discussed potential risks, benefits, and complications of cataract surgery including but not limited to pain, bleeding, infection, inflammation, edema, increased eye pressure, retinal tear/detachment, lens dislocation, ptosis, iris damage, need for additional surgery, need for glasses after surgery, loss of vision/loss of eye. Patient understands and wishes to proceed. All questions were answered. Will schedule cataract surgery OD. Lenstar done today.  Discussed IOL options (standard monofocal, monofocal with monovision, toric, multifocal). Lens chosen: standard monofocal. Defer/decline toric/multifocal lens at this time. Had thorough discussion with patient re: aim. Discussed that may potentially need glasses for best vision both at distance and at near.    Schedule cataract surgery OD  I personally reviewed the lenstar measurements and will choose the lens accordingly.  Aim: -2.50    Epiretinal membrane (ERM) of right eye  Macular hole, left  S/p PPV OU  ([facility] OS 2021, [place] OD 2024)  Discussed with pt r/b/a of CE OD  Explained to pt higher risks of CE in an eye with prior vitrectomy. Risks include but not limied to zonular weakness, possible need for CTR, capsular hooks, lens or IOL dislocation and need for more surgeries.

## 2025-03-14 NOTE — PATIENT INSTRUCTIONS
What is a cataract?    We have a lens inside of our eye that focuses back and forth, just like a camera has a lens. When the lens inside of our eye becomes cloudy, it is known as a cataract. Most cataracts form as a result of the normal aging process.  Once a cataract has formed, it usually does not require treatment until it interferes with a patient's vision or quality of life. The only treatment for a cataract is surgical removal of the cataract.   Common symptoms of cataracts include blurry vision, significant glare and halos when driving at night, dim vision requiring more light, yellowed vision with dull colors or sometimes you can even get double vision in one eye. If these symptoms are significant enough to interfere with your daily activities (driving, reading, watching TV…etc.), you have a significant cataract and will most likely benefit from cataract surgery.    Will Cataract Surgery Improve Your Vision?  Cataract surgery will be recommended if it is expected to improve your vision or if the cataracts are impairing our ability to diagnose and treat other eye diseases.  It is very important to us that your visual expectations are the same as ours when considering cataract surgery.  Though cataract surgery is the most common elective surgery in the [place], individual situations and circumstances can vary.  We do everything we can to understand your needs and manage your expectations.    What is cataract surgery?   Cataract surgery is a one day surgery (you are in and out of the hospital in the same day). It typically takes about 15 minutes. However, the entire time you will spend at the surgery center will be between 2-3 hours to get you ready for the surgery as well as to ensure you are doing well afterwards. The surgery in most times is done under some very light sedation (similar to a colonoscopy) which will usually help with anxiety and gets you relaxed. In rare situations, some patients need to  have general anesthesia for the surgery if they cannot lay still for the procedure (children, patients with developmental delays, severe anxiety…etc.). Few patients also elect to get no sedation at all.  After the surgery is over, you will be discharged to your home or facility and will start using your post surgery eye drops. You will have a shield over your eye that you will keep on for the 1st day. You will be provided with instructions on which drops to use and for how long.  Most people can resume their usual activities after cataract surgery. The main precautions include:  Any significant physical straining (lifting heavy objects >10-15lbs, bending down) for 2 weeks.  Getting water inside the eye for 2 weeks (you may still shower and wash your face but be careful water does not go inside your eye. You may close your eye or use a patch when in the shower).  No swimming for 1 month  You will need to sleep with a shield for 1 week over the eye.   The surgery is usually very safe and the results are excellent, however, with any eye surgery, there are certain risks such as infection, loss of vision, need for other surgeries and even loss of the eye itself. These risks are very rare but they could still happen. Certain eyes have more risk factors than others for a complication to happen. Occasionally, certain risk factors become evident during the surgery itself and are not clearly visible during your pre surgery evaluation. However, most cataract surgeons are trained well to deal with challenging situations should they arise during the surgery.  You do not need to stop any medications before the surgery. The surgery center will also call you before the surgery to get some medical history information from you and to give you the final instructions for the day before and the day of the surgery.  It is very important to know that the cataract surgery will correct the vision problem in your eye that is caused by the  cataract only. In other words, certain patients who have other eye diseases affecting their vision, will notice an improvement in their vision after the cataract surgery but that improvement could be limited by the other conditions that they may have. Few patients may still need another form of treatment for their eyes (medications, injections, laser or even surgery) after their cataract surgery to further improve their vision.    Is it possible that I could be free from glasses after cataract surgery?    When cataract is removed, a new artificial lens is implanted into the eye. Patients today have the option of selecting a lens implant that will correct near-sightedness, far-sightedness and astigmatism. Newer high-technology lens implants (premium lenses) are available through our practice such as toric, extended depth of focus and multifocal lens implants that can deliver patients with clear distance and/or near vision without the need for glasses.  Briefly, if a patient who has a cataract has no astigmatism, they have a choice to either correct their vision for distance only using the standard lens implant that is covered by insurance and they could wear over the counter reading glasses to help with reading or they could get an extended depth of focus/multifocal lens implant that helps them see distance (such as driving, watching TV) and near (reading) without needing glasses.   On the other hand, patients who have astigmatism, have the choice of getting a) a standard lens and still wear glasses after the surgery for distance and near, b) a toric lens (to correct astigmatism) that could help their distance vision without glasses but need reading glasses to see near or c) a toric extended depth of focus/multifocal lens implant that can correct their astigmatism and their ability to see both distance and near.  It is very important to note that although most patients who opt to choose premium lenses are very happy  with the results and their spectacle independence, this is not guaranteed to be achieved. Some patients may still need to wear glasses (a small prescription) even if a premium lens has been implanted at the time of cataract surgery.   It is also important to know that not all eyes are good candidates for these premium lenses.

## 2025-04-24 ENCOUNTER — APPOINTMENT (OUTPATIENT)
Dept: OPHTHALMOLOGY | Facility: CLINIC | Age: 75
End: 2025-04-24
Payer: MEDICARE

## 2025-05-20 ENCOUNTER — APPOINTMENT (OUTPATIENT)
Dept: OPHTHALMOLOGY | Facility: CLINIC | Age: 75
End: 2025-05-20
Payer: MEDICARE

## 2025-05-20 DIAGNOSIS — H35.342 MACULAR HOLE, LEFT: ICD-10-CM

## 2025-05-20 DIAGNOSIS — H35.351 CYSTOID MACULAR EDEMA, RIGHT EYE: ICD-10-CM

## 2025-05-20 DIAGNOSIS — H43.811 POSTERIOR VITREOUS DETACHMENT OF RIGHT EYE: ICD-10-CM

## 2025-05-20 DIAGNOSIS — H35.341 MACULAR HOLE, RIGHT EYE: Primary | ICD-10-CM

## 2025-05-20 PROCEDURE — 99213 OFFICE O/P EST LOW 20 MIN: CPT

## 2025-05-20 PROCEDURE — 92134 CPTRZ OPH DX IMG PST SGM RTA: CPT

## 2025-05-20 ASSESSMENT — ENCOUNTER SYMPTOMS
EYES NEGATIVE: 1
PSYCHIATRIC NEGATIVE: 0
CONSTITUTIONAL NEGATIVE: 0
GASTROINTESTINAL NEGATIVE: 0
CARDIOVASCULAR NEGATIVE: 0
ENDOCRINE NEGATIVE: 0
MUSCULOSKELETAL NEGATIVE: 0
NEUROLOGICAL NEGATIVE: 0
HEMATOLOGIC/LYMPHATIC NEGATIVE: 0
RESPIRATORY NEGATIVE: 0
ALLERGIC/IMMUNOLOGIC NEGATIVE: 0

## 2025-05-20 ASSESSMENT — CONF VISUAL FIELD
OD_SUPERIOR_NASAL_RESTRICTION: 0
OS_SUPERIOR_TEMPORAL_RESTRICTION: 0
OS_INFERIOR_TEMPORAL_RESTRICTION: 0
OD_INFERIOR_TEMPORAL_RESTRICTION: 0
OS_INFERIOR_NASAL_RESTRICTION: 0
OD_NORMAL: 1
OD_INFERIOR_NASAL_RESTRICTION: 0
OS_NORMAL: 1
OD_SUPERIOR_TEMPORAL_RESTRICTION: 0
OS_SUPERIOR_NASAL_RESTRICTION: 0

## 2025-05-20 ASSESSMENT — VISUAL ACUITY
OD_CC: 20/200
METHOD: SNELLEN - LINEAR
OS_CC+: -2
CORRECTION_TYPE: GLASSES
OS_CC: 20/25
OD_PH_CC: 20/50
OD_CC+: -1
OD_PH_CC+: +2

## 2025-05-20 ASSESSMENT — TONOMETRY
IOP_METHOD: TONOPEN
OD_IOP_MMHG: 15
OS_IOP_MMHG: 14

## 2025-05-20 ASSESSMENT — CUP TO DISC RATIO
OD_RATIO: 0.3
OS_RATIO: 0.3

## 2025-05-20 ASSESSMENT — EXTERNAL EXAM - LEFT EYE: OS_EXAM: NORMAL

## 2025-05-20 ASSESSMENT — EXTERNAL EXAM - RIGHT EYE: OD_EXAM: NORMAL

## 2025-05-20 NOTE — PROGRESS NOTES
# Macular hole, right eye  # Epiretinal membrane (ERM) of right eye  - S/P PPV MP EL SF6 10/14/24  - POM 4  - Doing well, hole is closed; residual subfoveal lucency; improved  - Retina is flat  - No signs of infection    # Macular hole left eye  - s/p PPV ICG ILM Peel FAX 20% SF6 OS ([place] 11/09/20)  - s/p Phaco IOL OS  ([place])  - s/p Yag OS  - Retina attached in all 4 quadrants, hole closed with persistent residual CME    Plan  - Off the drops  - MH closed; stable  - Will have CE OD with Dr. 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 De Jesus

## 2025-05-24 ENCOUNTER — ANESTHESIA EVENT (OUTPATIENT)
Dept: OPERATING ROOM | Facility: CLINIC | Age: 75
End: 2025-05-24
Payer: MEDICARE

## 2025-06-04 ENCOUNTER — ANESTHESIA (OUTPATIENT)
Dept: OPERATING ROOM | Facility: CLINIC | Age: 75
End: 2025-06-04
Payer: MEDICARE

## 2025-06-04 ENCOUNTER — HOSPITAL ENCOUNTER (OUTPATIENT)
Facility: CLINIC | Age: 75
Setting detail: OUTPATIENT SURGERY
Discharge: HOME | End: 2025-06-04
Attending: OPHTHALMOLOGY | Admitting: OPHTHALMOLOGY
Payer: MEDICARE

## 2025-06-04 VITALS
BODY MASS INDEX: 27.92 KG/M2 | SYSTOLIC BLOOD PRESSURE: 102 MMHG | HEART RATE: 74 BPM | TEMPERATURE: 97.2 F | WEIGHT: 142.2 LBS | DIASTOLIC BLOOD PRESSURE: 55 MMHG | RESPIRATION RATE: 16 BRPM | HEIGHT: 60 IN | OXYGEN SATURATION: 96 %

## 2025-06-04 DIAGNOSIS — H25.811 COMBINED FORM OF AGE-RELATED CATARACT, RIGHT EYE: Primary | ICD-10-CM

## 2025-06-04 PROCEDURE — 2720000007 HC OR 272 NO HCPCS: Performed by: OPHTHALMOLOGY

## 2025-06-04 PROCEDURE — 7100000009 HC PHASE TWO TIME - INITIAL BASE CHARGE: Performed by: OPHTHALMOLOGY

## 2025-06-04 PROCEDURE — 2500000004 HC RX 250 GENERAL PHARMACY W/ HCPCS (ALT 636 FOR OP/ED): Performed by: NURSE ANESTHETIST, CERTIFIED REGISTERED

## 2025-06-04 PROCEDURE — A66982 PR REMV CATARACT EXTRACAP,INSERT LENS,COMP: Performed by: NURSE ANESTHETIST, CERTIFIED REGISTERED

## 2025-06-04 PROCEDURE — 3600000008 HC OR TIME - EACH INCREMENTAL 1 MINUTE - PROCEDURE LEVEL THREE: Performed by: OPHTHALMOLOGY

## 2025-06-04 PROCEDURE — 2500000005 HC RX 250 GENERAL PHARMACY W/O HCPCS: Performed by: OPHTHALMOLOGY

## 2025-06-04 PROCEDURE — 3700000002 HC GENERAL ANESTHESIA TIME - EACH INCREMENTAL 1 MINUTE: Performed by: OPHTHALMOLOGY

## 2025-06-04 PROCEDURE — 7100000010 HC PHASE TWO TIME - EACH INCREMENTAL 1 MINUTE: Performed by: OPHTHALMOLOGY

## 2025-06-04 PROCEDURE — 2500000004 HC RX 250 GENERAL PHARMACY W/ HCPCS (ALT 636 FOR OP/ED): Performed by: OPHTHALMOLOGY

## 2025-06-04 PROCEDURE — 2760000001 HC OR 276 NO HCPCS: Performed by: OPHTHALMOLOGY

## 2025-06-04 PROCEDURE — 66984 XCAPSL CTRC RMVL W/O ECP: CPT | Performed by: OPHTHALMOLOGY

## 2025-06-04 PROCEDURE — 3600000003 HC OR TIME - INITIAL BASE CHARGE - PROCEDURE LEVEL THREE: Performed by: OPHTHALMOLOGY

## 2025-06-04 PROCEDURE — 3700000001 HC GENERAL ANESTHESIA TIME - INITIAL BASE CHARGE: Performed by: OPHTHALMOLOGY

## 2025-06-04 PROCEDURE — V2632 POST CHMBR INTRAOCULAR LENS: HCPCS | Performed by: OPHTHALMOLOGY

## 2025-06-04 DEVICE — CLAREON ASPHERIC HYDROPHOBIC ACRYLIC IOL WITH THE AUTONOMEAUTOMATED PRE-LOADED DELIVERY SYSTEM
Type: IMPLANTABLE DEVICE | Site: EYE | Status: FUNCTIONAL
Brand: CLAREON™

## 2025-06-04 RX ORDER — PHENYLEPHRINE HYDROCHLORIDE 25 MG/ML
1 SOLUTION/ DROPS OPHTHALMIC
Status: COMPLETED | OUTPATIENT
Start: 2025-06-04 | End: 2025-06-04

## 2025-06-04 RX ORDER — ACETAMINOPHEN 325 MG/1
650 TABLET ORAL EVERY 4 HOURS PRN
Status: DISCONTINUED | OUTPATIENT
Start: 2025-06-04 | End: 2025-06-04 | Stop reason: HOSPADM

## 2025-06-04 RX ORDER — CYCLOPENTOLATE HYDROCHLORIDE 10 MG/ML
1 SOLUTION/ DROPS OPHTHALMIC
Status: COMPLETED | OUTPATIENT
Start: 2025-06-04 | End: 2025-06-04

## 2025-06-04 RX ORDER — METOCLOPRAMIDE HYDROCHLORIDE 5 MG/ML
10 INJECTION INTRAMUSCULAR; INTRAVENOUS ONCE AS NEEDED
Status: DISCONTINUED | OUTPATIENT
Start: 2025-06-04 | End: 2025-06-04 | Stop reason: HOSPADM

## 2025-06-04 RX ORDER — ONDANSETRON HYDROCHLORIDE 2 MG/ML
4 INJECTION, SOLUTION INTRAVENOUS ONCE AS NEEDED
Status: DISCONTINUED | OUTPATIENT
Start: 2025-06-04 | End: 2025-06-04 | Stop reason: HOSPADM

## 2025-06-04 RX ORDER — LIDOCAINE HYDROCHLORIDE 10 MG/ML
0.1 INJECTION, SOLUTION EPIDURAL; INFILTRATION; INTRACAUDAL; PERINEURAL ONCE
Status: DISCONTINUED | OUTPATIENT
Start: 2025-06-04 | End: 2025-06-04 | Stop reason: HOSPADM

## 2025-06-04 RX ORDER — KETOROLAC TROMETHAMINE 4 MG/ML
1 SOLUTION/ DROPS OPHTHALMIC 4 TIMES DAILY
Qty: 5 ML | Refills: 0 | Status: SHIPPED | OUTPATIENT
Start: 2025-06-04

## 2025-06-04 RX ORDER — TROPICAMIDE 10 MG/ML
1 SOLUTION/ DROPS OPHTHALMIC
Status: COMPLETED | OUTPATIENT
Start: 2025-06-04 | End: 2025-06-04

## 2025-06-04 RX ORDER — MIDAZOLAM HYDROCHLORIDE 1 MG/ML
INJECTION, SOLUTION INTRAMUSCULAR; INTRAVENOUS AS NEEDED
Status: DISCONTINUED | OUTPATIENT
Start: 2025-06-04 | End: 2025-06-04

## 2025-06-04 RX ORDER — MOXIFLOXACIN 5 MG/ML
SOLUTION/ DROPS OPHTHALMIC AS NEEDED
Status: DISCONTINUED | OUTPATIENT
Start: 2025-06-04 | End: 2025-06-04 | Stop reason: HOSPADM

## 2025-06-04 RX ORDER — TETRACAINE HYDROCHLORIDE 5 MG/ML
1 SOLUTION OPHTHALMIC ONCE
Status: COMPLETED | OUTPATIENT
Start: 2025-06-04 | End: 2025-06-04

## 2025-06-04 RX ORDER — PREDNISOLONE ACETATE 10 MG/ML
1 SUSPENSION/ DROPS OPHTHALMIC 4 TIMES DAILY
Qty: 5 ML | Refills: 1 | Status: SHIPPED | OUTPATIENT
Start: 2025-06-04

## 2025-06-04 RX ORDER — FENTANYL CITRATE 50 UG/ML
INJECTION, SOLUTION INTRAMUSCULAR; INTRAVENOUS AS NEEDED
Status: DISCONTINUED | OUTPATIENT
Start: 2025-06-04 | End: 2025-06-04

## 2025-06-04 RX ORDER — LIDOCAINE HYDROCHLORIDE 10 MG/ML
INJECTION, SOLUTION EPIDURAL; INFILTRATION; INTRACAUDAL; PERINEURAL AS NEEDED
Status: DISCONTINUED | OUTPATIENT
Start: 2025-06-04 | End: 2025-06-04 | Stop reason: HOSPADM

## 2025-06-04 RX ORDER — OXYCODONE HYDROCHLORIDE 5 MG/1
5 TABLET ORAL EVERY 4 HOURS PRN
Status: DISCONTINUED | OUTPATIENT
Start: 2025-06-04 | End: 2025-06-04 | Stop reason: HOSPADM

## 2025-06-04 RX ORDER — TRIAMCINOLONE ACETONIDE 40 MG/ML
INJECTION, SUSPENSION INTRA-ARTICULAR; INTRAMUSCULAR AS NEEDED
Status: DISCONTINUED | OUTPATIENT
Start: 2025-06-04 | End: 2025-06-04 | Stop reason: HOSPADM

## 2025-06-04 RX ORDER — FENTANYL CITRATE 50 UG/ML
25 INJECTION, SOLUTION INTRAMUSCULAR; INTRAVENOUS EVERY 5 MIN PRN
Status: DISCONTINUED | OUTPATIENT
Start: 2025-06-04 | End: 2025-06-04 | Stop reason: HOSPADM

## 2025-06-04 RX ORDER — MOXIFLOXACIN 5 MG/ML
1 SOLUTION/ DROPS OPHTHALMIC
Status: COMPLETED | OUTPATIENT
Start: 2025-06-04 | End: 2025-06-04

## 2025-06-04 RX ORDER — POVIDONE-IODINE 5 %
SOLUTION, NON-ORAL OPHTHALMIC (EYE) AS NEEDED
Status: DISCONTINUED | OUTPATIENT
Start: 2025-06-04 | End: 2025-06-04 | Stop reason: HOSPADM

## 2025-06-04 RX ORDER — TETRACAINE HYDROCHLORIDE 5 MG/ML
SOLUTION OPHTHALMIC AS NEEDED
Status: DISCONTINUED | OUTPATIENT
Start: 2025-06-04 | End: 2025-06-04 | Stop reason: HOSPADM

## 2025-06-04 RX ORDER — FENTANYL CITRATE 50 UG/ML
50 INJECTION, SOLUTION INTRAMUSCULAR; INTRAVENOUS EVERY 5 MIN PRN
Status: DISCONTINUED | OUTPATIENT
Start: 2025-06-04 | End: 2025-06-04 | Stop reason: HOSPADM

## 2025-06-04 RX ORDER — LABETALOL HYDROCHLORIDE 5 MG/ML
5 INJECTION, SOLUTION INTRAVENOUS ONCE AS NEEDED
Status: DISCONTINUED | OUTPATIENT
Start: 2025-06-04 | End: 2025-06-04 | Stop reason: HOSPADM

## 2025-06-04 RX ORDER — ALBUTEROL SULFATE 0.83 MG/ML
2.5 SOLUTION RESPIRATORY (INHALATION) ONCE AS NEEDED
Status: DISCONTINUED | OUTPATIENT
Start: 2025-06-04 | End: 2025-06-04 | Stop reason: HOSPADM

## 2025-06-04 RX ADMIN — CYCLOPENTOLATE HYDROCHLORIDE 1 DROP: 10 SOLUTION/ DROPS OPHTHALMIC at 12:35

## 2025-06-04 RX ADMIN — MIDAZOLAM 1.5 MG: 1 INJECTION INTRAMUSCULAR; INTRAVENOUS at 12:55

## 2025-06-04 RX ADMIN — PHENYLEPHRINE HYDROCHLORIDE 1 DROP: 25 SOLUTION/ DROPS OPHTHALMIC at 12:35

## 2025-06-04 RX ADMIN — MIDAZOLAM 0.5 MG: 1 INJECTION INTRAMUSCULAR; INTRAVENOUS at 12:53

## 2025-06-04 RX ADMIN — MOXIFLOXACIN HYDROCHLORIDE 1 DROP: 5 SOLUTION/ DROPS OPHTHALMIC at 12:35

## 2025-06-04 RX ADMIN — TETRACAINE HYDROCHLORIDE 1 DROP: 5 SOLUTION OPHTHALMIC at 12:25

## 2025-06-04 RX ADMIN — MOXIFLOXACIN HYDROCHLORIDE 1 DROP: 5 SOLUTION/ DROPS OPHTHALMIC at 12:25

## 2025-06-04 RX ADMIN — CYCLOPENTOLATE HYDROCHLORIDE 1 DROP: 10 SOLUTION/ DROPS OPHTHALMIC at 12:25

## 2025-06-04 RX ADMIN — CYCLOPENTOLATE HYDROCHLORIDE 1 DROP: 10 SOLUTION/ DROPS OPHTHALMIC at 12:30

## 2025-06-04 RX ADMIN — TROPICAMIDE 1 DROP: 10 SOLUTION/ DROPS OPHTHALMIC at 12:30

## 2025-06-04 RX ADMIN — MOXIFLOXACIN HYDROCHLORIDE 1 DROP: 5 SOLUTION/ DROPS OPHTHALMIC at 12:30

## 2025-06-04 RX ADMIN — FENTANYL CITRATE 25 MCG: 50 INJECTION, SOLUTION INTRAMUSCULAR; INTRAVENOUS at 12:55

## 2025-06-04 RX ADMIN — PHENYLEPHRINE HYDROCHLORIDE 1 DROP: 25 SOLUTION/ DROPS OPHTHALMIC at 12:30

## 2025-06-04 RX ADMIN — PHENYLEPHRINE HYDROCHLORIDE 1 DROP: 25 SOLUTION/ DROPS OPHTHALMIC at 12:25

## 2025-06-04 RX ADMIN — TROPICAMIDE 1 DROP: 10 SOLUTION/ DROPS OPHTHALMIC at 12:35

## 2025-06-04 RX ADMIN — TROPICAMIDE 1 DROP: 10 SOLUTION/ DROPS OPHTHALMIC at 12:25

## 2025-06-04 SDOH — HEALTH STABILITY: MENTAL HEALTH: CURRENT SMOKER: 0

## 2025-06-04 ASSESSMENT — PAIN - FUNCTIONAL ASSESSMENT
PAIN_FUNCTIONAL_ASSESSMENT: 0-10
PAIN_FUNCTIONAL_ASSESSMENT: 0-10

## 2025-06-04 ASSESSMENT — ENCOUNTER SYMPTOMS
CARDIOVASCULAR NEGATIVE: 1
NEUROLOGICAL NEGATIVE: 1
RESPIRATORY NEGATIVE: 1
GASTROINTESTINAL NEGATIVE: 1
CONSTITUTIONAL NEGATIVE: 1

## 2025-06-04 ASSESSMENT — PAIN SCALES - GENERAL
PAINLEVEL_OUTOF10: 0 - NO PAIN
PAINLEVEL_OUTOF10: 0 - NO PAIN

## 2025-06-04 NOTE — BRIEF OP NOTE
Date: 2025  OR Location: [facility] OR    Name: Ronna Elizalde, : 1950, Age: 74 y.o., MRN: [id number], Sex: female    Diagnosis  Pre-op Diagnosis      * Combined form of age-related cataract, right eye [H25.811] Post-op Diagnosis     * Combined form of age-related cataract, right eye [H25.811]     Procedures  Cataract extraction with intraocular lens implantation  34182 - IN XCAPSL CTRC RMVL INSJ IO LENS PROSTH W/O ECP      Surgeons      * Raul Lee - Primary    Resident/Fellow/Other Assistant:  Surgeons and Role:  * No surgeons found with a matching role *    Staff:   Relief Circulator: Hattie Parikh Scrub: Elvira Cuadra Person: Sandhya  Circulator: Cherelle  Circulator: Antonia    Anesthesia Staff: CRNA: NED Bassett-CRNA    Procedure Summary  Anesthesia: Monitor Anesthesia Care  ASA: II  Estimated Blood Loss: <5 mL  Intra-op Medications:   Administrations occurring from 1250 to 1330 on 25:   Medication Name Total Dose   chondroitin sulf-sod hyaluron (Duovisc) intraocular kit 1 mL   moxifloxacin (Vigamox) 0.5 % ophthalmic solution 1 drop   triamcinolone acetonide (Kenalog-40) injection 20 mg   tetracaine (PF) 0.5 % ophthalmic solution 2 drop   balanced salts (BSS) intraocular solution 15 mL 15 mL   lidocaine PF (Xylocaine) 10 mg/mL (1 %) injection 1 mL   povidone-iodine 5 % ophthalmic solution 1 Application   balanced salts (BSS) intraocular solution 500 mL   fentaNYL (Sublimaze) injection 50 mcg/mL 25 mcg   midazolam (Versed) injection 1 mg/mL 2 mg              Anesthesia Record               Intraprocedure I/O Totals       None           Specimen: No specimens collected               Findings: Cataract, right eye    Complications:  None; patient tolerated the procedure well.     Disposition: PACU - hemodynamically stable.  Condition: stable  Specimens Collected: No specimens collected  Attending Attestation:     Raul Lee  Phone Number: [phone]

## 2025-06-04 NOTE — H&P
History Of Present Illness  Ronna Elizalde is a 74 y.o. female presenting with cataract, right eye.     Past Medical History  Medical History[1]    Surgical History  Surgical History[2]     Social History  She reports that she has never smoked. She has never used smokeless tobacco. She reports that she does not currently use alcohol. She reports that she does not use drugs.    Family History  Family History[3]     Allergies  Penicillins    Review of Systems   Constitutional: Negative.    Respiratory: Negative.     Cardiovascular: Negative.    Gastrointestinal: Negative.    Neurological: Negative.         Physical Exam  Pulmonary:      Effort: Pulmonary effort is normal.   Abdominal:      Palpations: Abdomen is soft.   Skin:     General: Skin is warm.   Neurological:      Mental Status: She is alert. Mental status is at baseline.   Psychiatric:         Mood and Affect: Mood normal.         Thought Content: Thought content normal.          Last Recorded Vitals  Weight 66.2 kg (145 lb 15.1 oz).    Relevant Results        Current Outpatient Medications   Medication Instructions    cholecalciferol (VITAMIN D-3) 2,000 Units, Daily          Assessment & Plan  Combined form of age-related cataract, right eye      Ronna Elizalde is a 74 y.o. female presenting with cataract, right eye here for phaco/intraocular lens (IOL) insertion, right eye          Ruba Ragland MD         [1]   Past Medical History:  Diagnosis Date    Adverse effect of anesthesia     had emergence delirium from general anesthesia    Age-related nuclear cataract, left eye 06/22/2021    Cataract, nuclear sclerotic, left eye    Arthritis     Migraines     Personal history of other diseases of the nervous system and sense organs     History of keratitis    Sciatica     Unspecified disorder of eyelid     Eyelid lesion   [2]   Past Surgical History:  Procedure Laterality Date    CATARACT EXTRACTION Left     EYE SURGERY Left     vitrectomy   [3]    Family History  Problem Relation Name Age of Onset    Diabetes Father

## 2025-06-04 NOTE — ANESTHESIA PREPROCEDURE EVALUATION
Patient: Ronna Elizalde    Procedure Information       Date/Time: 06/04/25 1250    Procedure: Cataract extraction with intraocular lens implantation (Right)    [place] [facility] [place] [facility] [place] [facility] [place] [facility] [place]    Surgeons: Raul Lee MD            Relevant Problems   Anesthesia (within normal limits)      Cardiac (within normal limits)      Pulmonary (within normal limits)      Neuro   (+) History of migraine headaches      GI   (+) Gastroesophageal reflux disease      /Renal (within normal limits)      Liver (within normal limits)      Endocrine (within normal limits)      Hematology (within normal limits)      Musculoskeletal (within normal limits)       Clinical information reviewed:   Tobacco  Allergies  Meds   Med Hx  Surg Hx   Fam Hx  Soc Hx        NPO Detail:  No data recorded     Physical Exam    Airway  Mallampati: III  TM distance: >3 FB  Neck ROM: full  Mouth opening: 3 or more finger widths     Cardiovascular - normal exam   Dental - normal exam     Pulmonary - normal exam   Abdominal - normal exam           Anesthesia Plan    History of general anesthesia?: yes  History of complications of general anesthesia?: no    ASA 2     MAC     The patient is not a current smoker.    intravenous induction   Anesthetic plan and risks discussed with patient.    Plan discussed with CRNA and CAA.

## 2025-06-04 NOTE — OP NOTE
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]     Procedures  Cataract extraction with intraocular lens implantation  71289 - ME XCAPSL CTRC RMVL INSJ IO LENS PROSTH W/O ECP      Surgeons      * Raul Lee - Primary    Resident/Fellow/Other Assistant:  Surgeons and Role:  * No surgeons found with a matching role *    Staff:   Relief Circulator: Hattie Parikh Scrub: Elvira Cuadra Person: Sandhya  Circulator: Cherelle  Circulator: Antonia    Anesthesia Staff: CRNA: WILLIAM Bassett    Procedure Summary  Anesthesia: Monitor Anesthesia Care  ASA: II  Estimated Blood Loss: 0mL  Intra-op Medications:   Administrations occurring from 1250 to 1330 on 25:   Medication Name Total Dose   chondroitin sulf-sod hyaluron (Duovisc) intraocular kit 1 mL   moxifloxacin (Vigamox) 0.5 % ophthalmic solution 1 drop   triamcinolone acetonide (Kenalog-40) injection 20 mg   tetracaine (PF) 0.5 % ophthalmic solution 2 drop   balanced salts (BSS) intraocular solution 15 mL 15 mL   lidocaine PF (Xylocaine) 10 mg/mL (1 %) injection 1 mL   povidone-iodine 5 % ophthalmic solution 1 Application   balanced salts (BSS) intraocular solution 500 mL   fentaNYL (Sublimaze) injection 50 mcg/mL 25 mcg   midazolam (Versed) injection 1 mg/mL 2 mg              Anesthesia Record               Intraprocedure I/O Totals       None           Specimen: No specimens collected              Drains and/or Catheters: * None in log *    Tourniquet Times:         Implants:  Implants       Type Name Action Serial No.       6.0MM X 13MM CLAREON 1-PIECE ASPHERIC MONOFOCAL INTRAOCULAR LENS, 18.5 DIOPTER, W/AUTONOME AUTOMATED  PRE-LOADED DELIVERY SYSTEM, HYDROPHOBIC ACRYLIC Implanted [id number]              Findings: Cataract OD    Indications: Ronna Elizalde is an 74 y.o. 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     The patient was seen in the preoperative area. The risks, benefits, complications, treatment options, non-operative alternatives, expected recovery and outcomes were discussed with the patient. The possibilities of reaction to medication, pulmonary aspiration, injury to surrounding structures, bleeding, recurrent infection, the need for additional procedures, failure to diagnose a condition, and creating a complication requiring transfusion or operation were discussed with the patient. The patient concurred with the proposed plan, giving informed consent.  The site of surgery was properly noted/marked if necessary per policy. The patient has been actively warmed in preoperative area. Preoperative antibiotics have been ordered and given within 1 hours of incision. Venous thrombosis prophylaxis are not indicated.    Procedure Details: The patient was placed in the supine position on the operating room table where appropriate blood pressure and cardiac monitoring were initiated. The patient was prepped and draped in the usual sterile fashion for intraocular surgery. This included instillation of Betadine 5% onto the ocular surface followed by irrigation with balance salt solution a minute or two later. A lid speculum was placed and the operating microscope was positioned. One paracentesis stab incision was made to the left of the planned cataract incision with a 15-degree supersharp blade. 1 ml of preservative free lidocaine was injected into the AC. Viscoat was used to replace the aqueous humor. A temporal clear corneal wound was fashioned beginning at the limbus with a 2.2 mm keratome, extending 2 mm into clear cornea before entering the anterior chamber. A continuous tear  circular capsulorhexis of approximately 5 mm in diameter was performed. Hydrodissection was performed using a Maldonado canula. The endothelium was coated with viscoat again. Using the Ozil handpiece on the RSVP Law Lens Removal System, the nucleus was emulsified and aspirated using a divide-and-conquer technique. Residual cortex was removed from the eye with the irrigation/aspiration bimanually. ProVisc was used to inflate the capsular bag. The lens implant was inspected and found to be free of visible defects. The lens used was an Bladimir model CNA0T0, power 18.5 diopter intraocular lens. The lens was inserted into the capsular bag using the [place] insertion mechanism. The lens was centered with a Estrada hook. Residual viscoelastic was removed from the eye with the irrigation/aspiration instrument. Preservative free moxifloxacin was injected  intracameral. The wounds were checked and found to be watertight. 0.3ml of Diluted (1:3) triamcinolone acetonide was injected subonj inferiorly. The lid speculum was removed and the eye was dressed with Maxitrol ointment, eye pad, tape, and shield. The patient tolerated the procedure well, and there were no complications.   Evidence of Infection: No   Complications:  None; patient tolerated the procedure well.    Disposition: PACU - hemodynamically stable.  Condition: stable                 Additional Details: None    Attending Attestation: I performed the procedure.    Raul Lee  Phone Number: [phone]

## 2025-06-04 NOTE — PROGRESS NOTES
POD 1 s/p phaco OD (corrected for near)  PF and ketorolac qid  f/u 1 wk  ER precautions   PO instructions

## 2025-06-05 ENCOUNTER — APPOINTMENT (OUTPATIENT)
Dept: OPHTHALMOLOGY | Facility: CLINIC | Age: 75
End: 2025-06-05
Payer: MEDICARE

## 2025-06-05 DIAGNOSIS — Z96.1 PSEUDOPHAKIA OF BOTH EYES: Primary | ICD-10-CM

## 2025-06-05 PROCEDURE — 99024 POSTOP FOLLOW-UP VISIT: CPT | Performed by: OPHTHALMOLOGY

## 2025-06-05 ASSESSMENT — VISUAL ACUITY
METHOD: SNELLEN - LINEAR
OD_SC+: -2
OD_SC: J1
OD_SC: 20/30

## 2025-06-05 ASSESSMENT — EXTERNAL EXAM - LEFT EYE: OS_EXAM: NORMAL

## 2025-06-05 ASSESSMENT — EXTERNAL EXAM - RIGHT EYE: OD_EXAM: NORMAL

## 2025-06-05 ASSESSMENT — PAIN SCALES - GENERAL: PAIN_LEVEL: 0

## 2025-06-05 ASSESSMENT — TONOMETRY
IOP_METHOD: TONOPEN
OD_IOP_MMHG: 17

## 2025-06-05 ASSESSMENT — ENCOUNTER SYMPTOMS: EYES NEGATIVE: 1

## 2025-06-05 NOTE — ANESTHESIA POSTPROCEDURE EVALUATION
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])    Surgeons: Raul Lee MD Responsible Provider: WILLIAM Bassett    Anesthesia Type: MAC ASA Status: 2            Anesthesia Type: MAC    Vitals Value Taken Time   /55 06/04/25 13:53   Temp 36.2 °C (97.2 °F) 06/04/25 13:53   Pulse 74 06/04/25 13:53   Resp 16 06/04/25 13:53   SpO2 96 % 06/04/25 13:53       Anesthesia Post Evaluation    Patient location during evaluation: PACU  Patient participation: complete - patient participated  Level of consciousness: awake  Pain score: 0  Pain management: adequate  Airway patency: patent  Two or more strategies used to mitigate risk of obstructive sleep apnea  Cardiovascular status: acceptable  Respiratory status: acceptable  Hydration status: acceptable  Postoperative Nausea and Vomiting: none        No notable events documented.

## 2025-06-17 ENCOUNTER — APPOINTMENT (OUTPATIENT)
Dept: OPHTHALMOLOGY | Facility: CLINIC | Age: 75
End: 2025-06-17
Payer: MEDICARE

## 2025-06-17 DIAGNOSIS — Z96.1 PSEUDOPHAKIA OF BOTH EYES: ICD-10-CM

## 2025-06-17 DIAGNOSIS — H35.342 MACULAR HOLE, LEFT: ICD-10-CM

## 2025-06-17 DIAGNOSIS — H35.341 MACULAR HOLE, RIGHT EYE: Primary | ICD-10-CM

## 2025-06-17 DIAGNOSIS — H35.351 CYSTOID MACULAR EDEMA, RIGHT EYE: ICD-10-CM

## 2025-06-17 PROCEDURE — 92134 CPTRZ OPH DX IMG PST SGM RTA: CPT

## 2025-06-17 PROCEDURE — 99024 POSTOP FOLLOW-UP VISIT: CPT

## 2025-06-17 ASSESSMENT — TONOMETRY
IOP_METHOD: TONOPEN
OD_IOP_MMHG: 18
OS_IOP_MMHG: 15

## 2025-06-17 ASSESSMENT — CONF VISUAL FIELD
OS_SUPERIOR_TEMPORAL_RESTRICTION: 0
OD_SUPERIOR_NASAL_RESTRICTION: 0
OS_SUPERIOR_NASAL_RESTRICTION: 0
OD_INFERIOR_TEMPORAL_RESTRICTION: 0
OD_NORMAL: 1
OD_INFERIOR_NASAL_RESTRICTION: 0
OS_INFERIOR_TEMPORAL_RESTRICTION: 0
OD_SUPERIOR_TEMPORAL_RESTRICTION: 0
OS_INFERIOR_NASAL_RESTRICTION: 0
OS_NORMAL: 1

## 2025-06-17 ASSESSMENT — EXTERNAL EXAM - LEFT EYE: OS_EXAM: NORMAL

## 2025-06-17 ASSESSMENT — VISUAL ACUITY
OD_CC+: -2
OS_CC: 20/25
OS_CC+: -2
CORRECTION_TYPE: GLASSES
METHOD: SNELLEN - LINEAR
OD_CC: 20/25

## 2025-06-17 ASSESSMENT — ENCOUNTER SYMPTOMS
ALLERGIC/IMMUNOLOGIC NEGATIVE: 0
MUSCULOSKELETAL NEGATIVE: 0
GASTROINTESTINAL NEGATIVE: 0
CONSTITUTIONAL NEGATIVE: 0
PSYCHIATRIC NEGATIVE: 0
HEMATOLOGIC/LYMPHATIC NEGATIVE: 0
EYES NEGATIVE: 1
CARDIOVASCULAR NEGATIVE: 0
ENDOCRINE NEGATIVE: 0
RESPIRATORY NEGATIVE: 0
NEUROLOGICAL NEGATIVE: 0

## 2025-06-17 ASSESSMENT — EXTERNAL EXAM - RIGHT EYE: OD_EXAM: NORMAL

## 2025-06-17 NOTE — PROGRESS NOTES
OCT 06/17/25     - Right eye (OD): abnormal foveal contour, intact RPE, outer and inner retinal layers. IRF    - Left eye (OS): abnormal foveal contour, intact RPE  outer and inner retinal layers. Persistent tr IRF    POW2 s/p phaco OD (corrected for near)  CME OD  Continue PF and ketorolac QID OD until appt with Dr. Lee given CME seen on OCT today  f/u 3 months, keep appt with Dr. Lee on 7/11/25  ER precautions   PO instructions

## 2025-06-24 ENCOUNTER — APPOINTMENT (OUTPATIENT)
Dept: OPHTHALMOLOGY | Facility: CLINIC | Age: 75
End: 2025-06-24
Payer: MEDICARE

## 2025-06-30 ENCOUNTER — APPOINTMENT (OUTPATIENT)
Dept: OPHTHALMOLOGY | Facility: CLINIC | Age: 75
End: 2025-06-30
Payer: MEDICARE

## 2025-07-10 NOTE — PROGRESS NOTES
POM 1 s/p phaco OD (corrected for near)  PF and ketorolac bid OD  Answered all questions  Fu prn for YAG OD

## 2025-07-11 ENCOUNTER — APPOINTMENT (OUTPATIENT)
Dept: OPHTHALMOLOGY | Facility: CLINIC | Age: 75
End: 2025-07-11
Payer: MEDICARE

## 2025-07-11 DIAGNOSIS — Z96.1 PSEUDOPHAKIA OF BOTH EYES: Primary | ICD-10-CM

## 2025-07-11 PROCEDURE — 99024 POSTOP FOLLOW-UP VISIT: CPT | Performed by: OPHTHALMOLOGY

## 2025-07-11 ASSESSMENT — EXTERNAL EXAM - RIGHT EYE: OD_EXAM: NORMAL

## 2025-07-11 ASSESSMENT — VISUAL ACUITY
OD_PH_SC: 20/50
OS_CC: 20/30
OD_PH_SC+: -2
OS_CC+: -2
CORRECTION_TYPE: GLASSES
METHOD: SNELLEN - LINEAR
OD_SC: J1
OD_SC+: -2
OD_SC: 20/100
OD_CC: 20/30

## 2025-07-11 ASSESSMENT — REFRACTION_WEARINGRX
OD_ADD: +2.50
OS_SPHERE: -2.00
OD_CYLINDER: -0.25
OS_CYLINDER: -0.75
OS_AXIS: 118
OS_ADD: +2.50
OD_AXIS: 055
OD_SPHERE: -2.00

## 2025-07-11 ASSESSMENT — ENCOUNTER SYMPTOMS
HEMATOLOGIC/LYMPHATIC NEGATIVE: 0
CARDIOVASCULAR NEGATIVE: 0
RESPIRATORY NEGATIVE: 0
NEUROLOGICAL NEGATIVE: 0
ENDOCRINE NEGATIVE: 0
PSYCHIATRIC NEGATIVE: 0
EYES NEGATIVE: 1
ALLERGIC/IMMUNOLOGIC NEGATIVE: 0
GASTROINTESTINAL NEGATIVE: 0
MUSCULOSKELETAL NEGATIVE: 0
CONSTITUTIONAL NEGATIVE: 0

## 2025-07-11 ASSESSMENT — REFRACTION_MANIFEST
OS_CYLINDER: -0.75
OS_ADD: +2.50
OS_AXIS: 118
OD_CYLINDER: -0.50
OD_ADD: +2.50
OS_SPHERE: -1.75
OD_SPHERE: -2.00
OD_AXIS: 055

## 2025-07-11 ASSESSMENT — TONOMETRY
OS_IOP_MMHG: 15
IOP_METHOD: TONOPEN
OD_IOP_MMHG: 17

## 2025-07-11 ASSESSMENT — EXTERNAL EXAM - LEFT EYE: OS_EXAM: NORMAL

## 2025-08-12 ENCOUNTER — APPOINTMENT (OUTPATIENT)
Dept: OPHTHALMOLOGY | Facility: CLINIC | Age: 75
End: 2025-08-12
Payer: MEDICARE

## 2025-08-12 DIAGNOSIS — H52.4 PRESBYOPIA: ICD-10-CM

## 2025-08-12 DIAGNOSIS — H52.13 MYOPIA OF BOTH EYES: ICD-10-CM

## 2025-08-12 DIAGNOSIS — H35.341 MACULAR HOLE, RIGHT EYE: ICD-10-CM

## 2025-08-12 DIAGNOSIS — H35.351 CYSTOID MACULAR EDEMA, RIGHT EYE: ICD-10-CM

## 2025-08-12 DIAGNOSIS — H02.88A MEIBOMIAN GLAND DYSFUNCTION (MGD) OF UPPER AND LOWER LIDS OF BOTH EYES: Primary | ICD-10-CM

## 2025-08-12 DIAGNOSIS — H52.223 REGULAR ASTIGMATISM OF BOTH EYES: ICD-10-CM

## 2025-08-12 DIAGNOSIS — H02.88B MEIBOMIAN GLAND DYSFUNCTION (MGD) OF UPPER AND LOWER LIDS OF BOTH EYES: Primary | ICD-10-CM

## 2025-08-12 DIAGNOSIS — Z96.1 PSEUDOPHAKIA OF BOTH EYES: ICD-10-CM

## 2025-08-12 ASSESSMENT — ENCOUNTER SYMPTOMS
PSYCHIATRIC NEGATIVE: 0
RESPIRATORY NEGATIVE: 0
ENDOCRINE NEGATIVE: 0
ALLERGIC/IMMUNOLOGIC NEGATIVE: 0
EYES NEGATIVE: 0
HEMATOLOGIC/LYMPHATIC NEGATIVE: 0
NEUROLOGICAL NEGATIVE: 0
GASTROINTESTINAL NEGATIVE: 0
MUSCULOSKELETAL NEGATIVE: 0
CONSTITUTIONAL NEGATIVE: 0
CARDIOVASCULAR NEGATIVE: 0

## 2025-08-12 ASSESSMENT — REFRACTION_MANIFEST
OD_ADD: +2.50
OS_AXIS: 021
OD_AXIS: 165
OS_AXIS: 027
OD_CYLINDER: SPHERE
OD_SPHERE: -2.50
METHOD_AUTOREFRACTION: 1
OS_SPHERE: -2.25
OS_SPHERE: -3.00
OS_CYLINDER: +1.00
OS_ADD: +2.25
OD_CYLINDER: +0.75
OS_CYLINDER: +1.00
OD_SPHERE: -2.75

## 2025-08-12 ASSESSMENT — TONOMETRY
OS_IOP_MMHG: 17
IOP_METHOD: GOLDMANN APPLANATION
OD_IOP_MMHG: 17

## 2025-08-12 ASSESSMENT — CONF VISUAL FIELD
OD_INFERIOR_NASAL_RESTRICTION: 0
OS_INFERIOR_NASAL_RESTRICTION: 0
METHOD: COUNTING FINGERS
OS_SUPERIOR_NASAL_RESTRICTION: 0
OD_SUPERIOR_TEMPORAL_RESTRICTION: 0
OD_SUPERIOR_NASAL_RESTRICTION: 0
OD_NORMAL: 1
OS_INFERIOR_TEMPORAL_RESTRICTION: 0
OS_NORMAL: 1
OD_INFERIOR_TEMPORAL_RESTRICTION: 0
OS_SUPERIOR_TEMPORAL_RESTRICTION: 0

## 2025-08-12 ASSESSMENT — VISUAL ACUITY
OS_CC: 20/25
OS_PH_CC: 20/25
CORRECTION_TYPE: GLASSES
OS_CC+: -2
METHOD: SNELLEN - LINEAR
OS_CC: 20/25
OD_PH_CC+: -2
OD_PH_CC: 20/25
VA_OR_OD_CURRENT_RX: 20/20
OD_CC: 20/25
VA_OR_OS_CURRENT_RX: 20/25
OD_CC: 20/30

## 2025-08-12 ASSESSMENT — REFRACTION_WEARINGRX
OD_CYLINDER: -0.25
OD_SPHERE: -2.00
OS_SPHERE: -2.00
OS_ADD: +2.50
OS_CYLINDER: -0.75
OS_AXIS: 118
OD_AXIS: 055
OD_ADD: +2.50

## 2025-08-12 ASSESSMENT — EXTERNAL EXAM - LEFT EYE: OS_EXAM: NORMAL

## 2025-08-12 ASSESSMENT — REFRACTION_CURRENTRX
OS_SPHERE: -3.00
OS_BASECURVE: 7.42
OD_SPHERE: -3.00
OD_DIAMETER: 9.6
OD_BASECURVE: 7.50
OS_DIAMETER: 9.6
OD_CYLINDER: SPHERE
OS_CYLINDER: SPHERE

## 2025-08-12 ASSESSMENT — EXTERNAL EXAM - RIGHT EYE: OD_EXAM: NORMAL

## 2025-08-13 LAB
CENTRAL CORNEA THICKNESS (OD): 557 MICRONS
CENTRAL CORNEA THICKNESS (OS): 569 MICRONS
KMAX (OD): 47.2 DIOPTERS
KMAX (OS): 47.9 DIOPTERS
PACH THINNEST (OD): 556 MICRONS
PACH THINNEST (OS): 568 MICRONS
POSTERIOR FLOAT (OD): 5 MICRONS
POSTERIOR FLOAT (OS): 9 MICRONS
SIMK FLAT (OD): 45.5 DIOPTERS
SIMK FLAT (OS): 45.7 DIOPTERS
SIMK STEEP (OD): 45.7 DIOPTERS
SIMK STEEP (OS): 46.5 DIOPTERS
SIMK STEEP AXIS (OD): 171.8 DEGREES
SIMK STEEP AXIS (OS): 44.7 DEGREES

## 2025-08-13 ASSESSMENT — CUP TO DISC RATIO
OS_RATIO: 0.3
OD_RATIO: 0.3

## 2025-09-30 ENCOUNTER — APPOINTMENT (OUTPATIENT)
Dept: OPHTHALMOLOGY | Facility: CLINIC | Age: 75
End: 2025-09-30
Payer: MEDICARE

## (undated) DEVICE — Device

## (undated) DEVICE — NEEDLE, HYPODERMIC, REGULAR WALL, REGULAR BEVEL, 30 G X 0.5 IN

## (undated) DEVICE — NEEDLE, HYPODERMIC, REGULAR WALL, REGULAR BEVEL, 18 G X 1.5 IN

## (undated) DEVICE — CANNULA, HYDRODISSECTION, MICRO, 25 G X 8 MM

## (undated) DEVICE — SYRINGE, 5 CC, LUER LOCK

## (undated) DEVICE — LABELS, OR GENERAL, W/MARKER

## (undated) DEVICE — SYRINGE, 1 CC, LUER LOCK

## (undated) DEVICE — GLOVE, SURGICAL, PROTEXIS PI , 7.5, PF, LF

## (undated) DEVICE — SYRINGE, 10 CC, LUER LOCK

## (undated) DEVICE — PROBE, 25G ILLUMINATED FLEX CURVED LASER 2X W/RFID

## (undated) DEVICE — NEEDLE, ARTERIAL/VENOUS, BLUNT FILL, 18 G X 1.5 IN

## (undated) DEVICE — LENS, FLAT VITRECTOMY, DISPOSABLE

## (undated) DEVICE — INSTRUMENT, GRIESHABER REVOLUTION, 25 GA, ILM FORCEP

## (undated) DEVICE — AUTO GAS FULL, CONSTELLATION

## (undated) DEVICE — 25+GA HYPERVIT BEVEL 20K CPM WIDE TOTAL PLUS VITRECTOMY PAK

## (undated) DEVICE — NEEDLE, RETROBULBAR, 23G X 8MM

## (undated) DEVICE — TOWEL, SURGICAL, NEURO, O/R, 16 X 26, BLUE, STERILE

## (undated) DEVICE — GLOVE, SURGICAL, PROTEXIS PI , 8.0, PF, LF

## (undated) DEVICE — HANDPIECE,  IRRIGATION/ASPIRATION, 45DEG, 2.2MM-2.8MM, BLUE

## (undated) DEVICE — SUTURE, VICRYL, CTD, 7-0, TG1408, VIOLET

## (undated) DEVICE — DRESSING, TRANSPARENT, TEGADERM, 4 X 4-3/4 IN

## (undated) DEVICE — COVER HANDLE LIGHT, STERIS, BLUE, STERILE

## (undated) DEVICE — BIOM, OPTIC, HD PROFESSIONAL F/F=200MM

## (undated) DEVICE — DRESSING, TRANSPARENT, TEGADERM, 2-3/8 X 2-3/4 IN